# Patient Record
Sex: FEMALE | Race: WHITE | NOT HISPANIC OR LATINO
[De-identification: names, ages, dates, MRNs, and addresses within clinical notes are randomized per-mention and may not be internally consistent; named-entity substitution may affect disease eponyms.]

---

## 2017-02-28 ENCOUNTER — APPOINTMENT (OUTPATIENT)
Dept: PEDIATRIC MEDICAL GENETICS | Facility: CLINIC | Age: 37
End: 2017-02-28

## 2017-03-15 ENCOUNTER — APPOINTMENT (OUTPATIENT)
Dept: PEDIATRIC MEDICAL GENETICS | Facility: CLINIC | Age: 37
End: 2017-03-15

## 2017-03-16 ENCOUNTER — APPOINTMENT (OUTPATIENT)
Dept: HUMAN REPRODUCTION | Facility: CLINIC | Age: 37
End: 2017-03-16

## 2017-03-28 ENCOUNTER — APPOINTMENT (OUTPATIENT)
Dept: HUMAN REPRODUCTION | Facility: CLINIC | Age: 37
End: 2017-03-28

## 2017-03-28 ENCOUNTER — APPOINTMENT (OUTPATIENT)
Dept: RADIOLOGY | Facility: HOSPITAL | Age: 37
End: 2017-03-28

## 2017-04-06 ENCOUNTER — APPOINTMENT (OUTPATIENT)
Dept: HUMAN REPRODUCTION | Facility: CLINIC | Age: 37
End: 2017-04-06

## 2017-04-06 DIAGNOSIS — N97.9 FEMALE INFERTILITY, UNSPECIFIED: ICD-10-CM

## 2017-04-23 ENCOUNTER — TRANSCRIPTION ENCOUNTER (OUTPATIENT)
Age: 37
End: 2017-04-23

## 2017-04-25 ENCOUNTER — APPOINTMENT (OUTPATIENT)
Dept: HUMAN REPRODUCTION | Facility: CLINIC | Age: 37
End: 2017-04-25

## 2017-04-27 ENCOUNTER — APPOINTMENT (OUTPATIENT)
Dept: HUMAN REPRODUCTION | Facility: CLINIC | Age: 37
End: 2017-04-27

## 2017-05-30 ENCOUNTER — APPOINTMENT (OUTPATIENT)
Dept: HUMAN REPRODUCTION | Facility: CLINIC | Age: 37
End: 2017-05-30

## 2017-05-30 ENCOUNTER — OUTPATIENT (OUTPATIENT)
Dept: OUTPATIENT SERVICES | Facility: HOSPITAL | Age: 37
LOS: 1 days | End: 2017-05-30
Payer: COMMERCIAL

## 2017-05-30 ENCOUNTER — APPOINTMENT (OUTPATIENT)
Dept: RADIOLOGY | Facility: HOSPITAL | Age: 37
End: 2017-05-30

## 2017-05-30 DIAGNOSIS — N97.1 FEMALE INFERTILITY OF TUBAL ORIGIN: ICD-10-CM

## 2017-05-30 PROCEDURE — 74740 X-RAY FEMALE GENITAL TRACT: CPT

## 2017-05-30 PROCEDURE — 58340 CATHETER FOR HYSTEROGRAPHY: CPT

## 2017-06-08 ENCOUNTER — APPOINTMENT (OUTPATIENT)
Dept: HUMAN REPRODUCTION | Facility: CLINIC | Age: 37
End: 2017-06-08

## 2017-07-10 ENCOUNTER — OTHER (OUTPATIENT)
Age: 37
End: 2017-07-10

## 2017-07-10 DIAGNOSIS — Z31.69 ENCOUNTER FOR OTHER GENERAL COUNSELING AND ADVICE ON PROCREATION: ICD-10-CM

## 2017-07-10 RX ORDER — DOXYCYCLINE HYCLATE 100 MG/1
100 CAPSULE ORAL
Qty: 5 | Refills: 0 | Status: DISCONTINUED | COMMUNITY
Start: 2017-04-06 | End: 2017-07-10

## 2017-07-13 ENCOUNTER — APPOINTMENT (OUTPATIENT)
Dept: MATERNAL FETAL MEDICINE | Facility: CLINIC | Age: 37
End: 2017-07-13

## 2017-07-13 ENCOUNTER — APPOINTMENT (OUTPATIENT)
Dept: ANTEPARTUM | Facility: CLINIC | Age: 37
End: 2017-07-13

## 2017-07-13 VITALS
SYSTOLIC BLOOD PRESSURE: 138 MMHG | DIASTOLIC BLOOD PRESSURE: 82 MMHG | WEIGHT: 175 LBS | BODY MASS INDEX: 25.92 KG/M2 | HEIGHT: 69 IN

## 2017-07-31 ENCOUNTER — APPOINTMENT (OUTPATIENT)
Dept: HUMAN REPRODUCTION | Facility: CLINIC | Age: 37
End: 2017-07-31
Payer: COMMERCIAL

## 2017-07-31 PROCEDURE — 36415 COLL VENOUS BLD VENIPUNCTURE: CPT

## 2017-07-31 PROCEDURE — 99213 OFFICE O/P EST LOW 20 MIN: CPT | Mod: 25

## 2017-07-31 PROCEDURE — 76817 TRANSVAGINAL US OBSTETRIC: CPT

## 2017-08-07 ENCOUNTER — APPOINTMENT (OUTPATIENT)
Dept: HUMAN REPRODUCTION | Facility: CLINIC | Age: 37
End: 2017-08-07
Payer: COMMERCIAL

## 2017-08-07 PROCEDURE — 36415 COLL VENOUS BLD VENIPUNCTURE: CPT

## 2017-08-07 PROCEDURE — 76817 TRANSVAGINAL US OBSTETRIC: CPT

## 2017-08-07 PROCEDURE — 99213 OFFICE O/P EST LOW 20 MIN: CPT | Mod: 25

## 2017-08-14 ENCOUNTER — APPOINTMENT (OUTPATIENT)
Dept: HUMAN REPRODUCTION | Facility: CLINIC | Age: 37
End: 2017-08-14
Payer: COMMERCIAL

## 2017-08-14 PROCEDURE — 76817 TRANSVAGINAL US OBSTETRIC: CPT

## 2017-08-14 PROCEDURE — 99213 OFFICE O/P EST LOW 20 MIN: CPT | Mod: 25

## 2017-08-14 PROCEDURE — 36415 COLL VENOUS BLD VENIPUNCTURE: CPT

## 2017-09-18 ENCOUNTER — APPOINTMENT (OUTPATIENT)
Dept: PEDIATRIC MEDICAL GENETICS | Facility: CLINIC | Age: 37
End: 2017-09-18
Payer: COMMERCIAL

## 2017-09-18 DIAGNOSIS — O26.20 PREGNANCY CARE FOR PATIENT WITH RECURRENT PREGNANCY LOSS, UNSPECIFIED TRIMESTER: ICD-10-CM

## 2017-09-18 DIAGNOSIS — O09.529 SUPERVISION OF ELDERLY MULTIGRAVIDA, UNSPECIFIED TRIMESTER: ICD-10-CM

## 2017-09-18 PROCEDURE — 96040: CPT

## 2017-09-18 PROCEDURE — 36415 COLL VENOUS BLD VENIPUNCTURE: CPT

## 2017-09-19 ENCOUNTER — ASOB RESULT (OUTPATIENT)
Age: 37
End: 2017-09-19

## 2017-09-19 ENCOUNTER — APPOINTMENT (OUTPATIENT)
Dept: ANTEPARTUM | Facility: CLINIC | Age: 37
End: 2017-09-19
Payer: COMMERCIAL

## 2017-09-19 PROBLEM — O26.20 PREGNANCY COMPLICATED BY PREVIOUS RECURRENT MISCARRIAGES: Status: ACTIVE | Noted: 2017-09-19

## 2017-09-19 PROBLEM — O09.529 ADVANCED MATERNAL AGE IN MULTIGRAVIDA: Status: ACTIVE | Noted: 2017-09-19

## 2017-09-19 PROCEDURE — 76801 OB US < 14 WKS SINGLE FETUS: CPT

## 2017-09-19 PROCEDURE — 36416 COLLJ CAPILLARY BLOOD SPEC: CPT

## 2017-09-19 PROCEDURE — 76813 OB US NUCHAL MEAS 1 GEST: CPT

## 2017-11-20 ENCOUNTER — APPOINTMENT (OUTPATIENT)
Dept: ANTEPARTUM | Facility: CLINIC | Age: 37
End: 2017-11-20
Payer: COMMERCIAL

## 2017-11-20 ENCOUNTER — ASOB RESULT (OUTPATIENT)
Age: 37
End: 2017-11-20

## 2017-11-20 PROCEDURE — 76811 OB US DETAILED SNGL FETUS: CPT

## 2018-01-09 ENCOUNTER — APPOINTMENT (OUTPATIENT)
Dept: ANTEPARTUM | Facility: CLINIC | Age: 38
End: 2018-01-09
Payer: COMMERCIAL

## 2018-01-09 ENCOUNTER — ASOB RESULT (OUTPATIENT)
Age: 38
End: 2018-01-09

## 2018-01-09 PROCEDURE — 76816 OB US FOLLOW-UP PER FETUS: CPT

## 2018-03-14 ENCOUNTER — ASOB RESULT (OUTPATIENT)
Age: 38
End: 2018-03-14

## 2018-03-14 ENCOUNTER — APPOINTMENT (OUTPATIENT)
Dept: ANTEPARTUM | Facility: CLINIC | Age: 38
End: 2018-03-14
Payer: COMMERCIAL

## 2018-03-14 PROCEDURE — 76816 OB US FOLLOW-UP PER FETUS: CPT

## 2018-03-16 ENCOUNTER — APPOINTMENT (OUTPATIENT)
Dept: ANTEPARTUM | Facility: CLINIC | Age: 38
End: 2018-03-16

## 2018-04-06 ENCOUNTER — ASOB RESULT (OUTPATIENT)
Age: 38
End: 2018-04-06

## 2018-04-06 ENCOUNTER — APPOINTMENT (OUTPATIENT)
Dept: ANTEPARTUM | Facility: CLINIC | Age: 38
End: 2018-04-06
Payer: COMMERCIAL

## 2018-04-06 PROCEDURE — 76819 FETAL BIOPHYS PROFIL W/O NST: CPT

## 2018-04-08 ENCOUNTER — INPATIENT (INPATIENT)
Facility: HOSPITAL | Age: 38
LOS: 2 days | Discharge: HOME | End: 2018-04-11
Attending: OBSTETRICS & GYNECOLOGY | Admitting: OBSTETRICS & GYNECOLOGY
Payer: COMMERCIAL

## 2018-04-08 VITALS — HEIGHT: 69 IN | WEIGHT: 213.85 LBS

## 2018-04-08 DIAGNOSIS — O48.0 POST-TERM PREGNANCY: ICD-10-CM

## 2018-04-08 LAB
BASOPHILS # BLD AUTO: 0 K/UL — SIGNIFICANT CHANGE UP (ref 0–0.2)
BASOPHILS NFR BLD AUTO: 0.3 % — SIGNIFICANT CHANGE UP (ref 0–2)
BLD GP AB SCN SERPL QL: NEGATIVE — SIGNIFICANT CHANGE UP
EOSINOPHIL # BLD AUTO: 0.1 K/UL — SIGNIFICANT CHANGE UP (ref 0–0.5)
EOSINOPHIL NFR BLD AUTO: 0.7 % — SIGNIFICANT CHANGE UP (ref 0–6)
HCT VFR BLD CALC: 37.6 % — SIGNIFICANT CHANGE UP (ref 34.5–45)
HGB BLD-MCNC: 13.3 G/DL — SIGNIFICANT CHANGE UP (ref 11.5–15.5)
LYMPHOCYTES # BLD AUTO: 2.3 K/UL — SIGNIFICANT CHANGE UP (ref 1–3.3)
LYMPHOCYTES # BLD AUTO: 23.7 % — SIGNIFICANT CHANGE UP (ref 13–44)
MCHC RBC-ENTMCNC: 32.2 PG — SIGNIFICANT CHANGE UP (ref 27–34)
MCHC RBC-ENTMCNC: 35.4 GM/DL — SIGNIFICANT CHANGE UP (ref 32–36)
MCV RBC AUTO: 90.9 FL — SIGNIFICANT CHANGE UP (ref 80–100)
MONOCYTES # BLD AUTO: 1.2 K/UL — HIGH (ref 0–0.9)
MONOCYTES NFR BLD AUTO: 12.1 % — SIGNIFICANT CHANGE UP (ref 2–14)
NEUTROPHILS # BLD AUTO: 6.1 K/UL — SIGNIFICANT CHANGE UP (ref 1.8–7.4)
NEUTROPHILS NFR BLD AUTO: 63.2 % — SIGNIFICANT CHANGE UP (ref 43–77)
PLATELET # BLD AUTO: 258 K/UL — SIGNIFICANT CHANGE UP (ref 150–400)
RBC # BLD: 4.14 M/UL — SIGNIFICANT CHANGE UP (ref 3.8–5.2)
RBC # FLD: 12.9 % — SIGNIFICANT CHANGE UP (ref 10.3–14.5)
RH IG SCN BLD-IMP: POSITIVE — SIGNIFICANT CHANGE UP
WBC # BLD: 9.7 K/UL — SIGNIFICANT CHANGE UP (ref 3.8–10.5)
WBC # FLD AUTO: 9.7 K/UL — SIGNIFICANT CHANGE UP (ref 3.8–10.5)

## 2018-04-08 RX ORDER — SODIUM CHLORIDE 9 MG/ML
1000 INJECTION, SOLUTION INTRAVENOUS
Qty: 0 | Refills: 0 | Status: DISCONTINUED | OUTPATIENT
Start: 2018-04-08 | End: 2018-04-11

## 2018-04-08 RX ORDER — SODIUM CHLORIDE 9 MG/ML
1000 INJECTION, SOLUTION INTRAVENOUS
Qty: 0 | Refills: 0 | Status: DISCONTINUED | OUTPATIENT
Start: 2018-04-08 | End: 2018-04-08

## 2018-04-08 RX ORDER — OXYTOCIN 10 UNIT/ML
333.33 VIAL (ML) INJECTION
Qty: 20 | Refills: 0 | Status: DISCONTINUED | OUTPATIENT
Start: 2018-04-08 | End: 2018-04-09

## 2018-04-08 RX ORDER — SODIUM CHLORIDE 9 MG/ML
1000 INJECTION, SOLUTION INTRAVENOUS ONCE
Qty: 0 | Refills: 0 | Status: COMPLETED | OUTPATIENT
Start: 2018-04-08 | End: 2018-04-08

## 2018-04-08 RX ORDER — CITRIC ACID/SODIUM CITRATE 300-500 MG
15 SOLUTION, ORAL ORAL EVERY 4 HOURS
Qty: 0 | Refills: 0 | Status: DISCONTINUED | OUTPATIENT
Start: 2018-04-08 | End: 2018-04-09

## 2018-04-08 RX ADMIN — SODIUM CHLORIDE 2000 MILLILITER(S): 9 INJECTION, SOLUTION INTRAVENOUS at 20:45

## 2018-04-09 LAB — T PALLIDUM AB TITR SER: NEGATIVE — SIGNIFICANT CHANGE UP

## 2018-04-09 RX ORDER — SODIUM CHLORIDE 9 MG/ML
3 INJECTION INTRAMUSCULAR; INTRAVENOUS; SUBCUTANEOUS EVERY 8 HOURS
Qty: 0 | Refills: 0 | Status: DISCONTINUED | OUTPATIENT
Start: 2018-04-09 | End: 2018-04-09

## 2018-04-09 RX ORDER — HYDROCORTISONE 1 %
1 OINTMENT (GRAM) TOPICAL EVERY 4 HOURS
Qty: 0 | Refills: 0 | Status: DISCONTINUED | OUTPATIENT
Start: 2018-04-09 | End: 2018-04-09

## 2018-04-09 RX ORDER — DIBUCAINE 1 %
1 OINTMENT (GRAM) RECTAL EVERY 4 HOURS
Qty: 0 | Refills: 0 | Status: DISCONTINUED | OUTPATIENT
Start: 2018-04-09 | End: 2018-04-09

## 2018-04-09 RX ORDER — DIBUCAINE 1 %
1 OINTMENT (GRAM) RECTAL EVERY 4 HOURS
Qty: 0 | Refills: 0 | Status: DISCONTINUED | OUTPATIENT
Start: 2018-04-09 | End: 2018-04-11

## 2018-04-09 RX ORDER — AER TRAVELER 0.5 G/1
1 SOLUTION RECTAL; TOPICAL EVERY 4 HOURS
Qty: 0 | Refills: 0 | Status: DISCONTINUED | OUTPATIENT
Start: 2018-04-09 | End: 2018-04-09

## 2018-04-09 RX ORDER — SODIUM CHLORIDE 9 MG/ML
3 INJECTION INTRAMUSCULAR; INTRAVENOUS; SUBCUTANEOUS EVERY 8 HOURS
Qty: 0 | Refills: 0 | Status: DISCONTINUED | OUTPATIENT
Start: 2018-04-09 | End: 2018-04-11

## 2018-04-09 RX ORDER — LANOLIN
1 OINTMENT (GRAM) TOPICAL EVERY 6 HOURS
Qty: 0 | Refills: 0 | Status: DISCONTINUED | OUTPATIENT
Start: 2018-04-09 | End: 2018-04-11

## 2018-04-09 RX ORDER — IBUPROFEN 200 MG
600 TABLET ORAL EVERY 6 HOURS
Qty: 0 | Refills: 0 | Status: COMPLETED | OUTPATIENT
Start: 2018-04-09 | End: 2019-03-08

## 2018-04-09 RX ORDER — SIMETHICONE 80 MG/1
80 TABLET, CHEWABLE ORAL EVERY 6 HOURS
Qty: 0 | Refills: 0 | Status: DISCONTINUED | OUTPATIENT
Start: 2018-04-09 | End: 2018-04-11

## 2018-04-09 RX ORDER — ACETAMINOPHEN 500 MG
975 TABLET ORAL EVERY 6 HOURS
Qty: 0 | Refills: 0 | Status: COMPLETED | OUTPATIENT
Start: 2018-04-09 | End: 2019-03-08

## 2018-04-09 RX ORDER — IBUPROFEN 200 MG
600 TABLET ORAL EVERY 6 HOURS
Qty: 0 | Refills: 0 | Status: DISCONTINUED | OUTPATIENT
Start: 2018-04-09 | End: 2018-04-11

## 2018-04-09 RX ORDER — PRAMOXINE HYDROCHLORIDE 150 MG/15G
1 AEROSOL, FOAM RECTAL EVERY 4 HOURS
Qty: 0 | Refills: 0 | Status: DISCONTINUED | OUTPATIENT
Start: 2018-04-09 | End: 2018-04-11

## 2018-04-09 RX ORDER — OXYTOCIN 10 UNIT/ML
41.67 VIAL (ML) INJECTION
Qty: 20 | Refills: 0 | Status: DISCONTINUED | OUTPATIENT
Start: 2018-04-09 | End: 2018-04-09

## 2018-04-09 RX ORDER — ACETAMINOPHEN 500 MG
975 TABLET ORAL EVERY 6 HOURS
Qty: 0 | Refills: 0 | Status: DISCONTINUED | OUTPATIENT
Start: 2018-04-09 | End: 2018-04-11

## 2018-04-09 RX ORDER — GLYCERIN ADULT
1 SUPPOSITORY, RECTAL RECTAL AT BEDTIME
Qty: 0 | Refills: 0 | Status: DISCONTINUED | OUTPATIENT
Start: 2018-04-09 | End: 2018-04-11

## 2018-04-09 RX ORDER — PRAMOXINE HYDROCHLORIDE 150 MG/15G
1 AEROSOL, FOAM RECTAL EVERY 4 HOURS
Qty: 0 | Refills: 0 | Status: DISCONTINUED | OUTPATIENT
Start: 2018-04-09 | End: 2018-04-09

## 2018-04-09 RX ORDER — OXYTOCIN 10 UNIT/ML
41.67 VIAL (ML) INJECTION
Qty: 20 | Refills: 0 | Status: DISCONTINUED | OUTPATIENT
Start: 2018-04-09 | End: 2018-04-11

## 2018-04-09 RX ORDER — DOCUSATE SODIUM 100 MG
100 CAPSULE ORAL
Qty: 0 | Refills: 0 | Status: DISCONTINUED | OUTPATIENT
Start: 2018-04-09 | End: 2018-04-11

## 2018-04-09 RX ORDER — OXYCODONE HYDROCHLORIDE 5 MG/1
5 TABLET ORAL
Qty: 0 | Refills: 0 | Status: DISCONTINUED | OUTPATIENT
Start: 2018-04-09 | End: 2018-04-11

## 2018-04-09 RX ORDER — OXYCODONE HYDROCHLORIDE 5 MG/1
5 TABLET ORAL EVERY 4 HOURS
Qty: 0 | Refills: 0 | Status: DISCONTINUED | OUTPATIENT
Start: 2018-04-09 | End: 2018-04-11

## 2018-04-09 RX ORDER — AER TRAVELER 0.5 G/1
1 SOLUTION RECTAL; TOPICAL EVERY 4 HOURS
Qty: 0 | Refills: 0 | Status: DISCONTINUED | OUTPATIENT
Start: 2018-04-09 | End: 2018-04-11

## 2018-04-09 RX ORDER — HYDROCORTISONE 1 %
1 OINTMENT (GRAM) TOPICAL EVERY 4 HOURS
Qty: 0 | Refills: 0 | Status: DISCONTINUED | OUTPATIENT
Start: 2018-04-09 | End: 2018-04-11

## 2018-04-09 RX ORDER — TETANUS TOXOID, REDUCED DIPHTHERIA TOXOID AND ACELLULAR PERTUSSIS VACCINE, ADSORBED 5; 2.5; 8; 8; 2.5 [IU]/.5ML; [IU]/.5ML; UG/.5ML; UG/.5ML; UG/.5ML
0.5 SUSPENSION INTRAMUSCULAR ONCE
Qty: 0 | Refills: 0 | Status: DISCONTINUED | OUTPATIENT
Start: 2018-04-09 | End: 2018-04-11

## 2018-04-09 RX ORDER — MAGNESIUM HYDROXIDE 400 MG/1
30 TABLET, CHEWABLE ORAL
Qty: 0 | Refills: 0 | Status: DISCONTINUED | OUTPATIENT
Start: 2018-04-09 | End: 2018-04-11

## 2018-04-09 RX ORDER — DIPHENHYDRAMINE HCL 50 MG
25 CAPSULE ORAL EVERY 6 HOURS
Qty: 0 | Refills: 0 | Status: DISCONTINUED | OUTPATIENT
Start: 2018-04-09 | End: 2018-04-11

## 2018-04-09 RX ORDER — KETOROLAC TROMETHAMINE 30 MG/ML
30 SYRINGE (ML) INJECTION ONCE
Qty: 0 | Refills: 0 | Status: DISCONTINUED | OUTPATIENT
Start: 2018-04-09 | End: 2018-04-09

## 2018-04-09 RX ADMIN — Medication 975 MILLIGRAM(S): at 22:15

## 2018-04-09 RX ADMIN — Medication 600 MILLIGRAM(S): at 15:33

## 2018-04-09 RX ADMIN — Medication 600 MILLIGRAM(S): at 16:15

## 2018-04-09 RX ADMIN — Medication 975 MILLIGRAM(S): at 16:15

## 2018-04-09 RX ADMIN — Medication 975 MILLIGRAM(S): at 21:43

## 2018-04-09 RX ADMIN — Medication 30 MILLIGRAM(S): at 10:47

## 2018-04-09 RX ADMIN — Medication 975 MILLIGRAM(S): at 15:34

## 2018-04-09 RX ADMIN — Medication 600 MILLIGRAM(S): at 21:43

## 2018-04-09 RX ADMIN — Medication 30 MILLIGRAM(S): at 11:39

## 2018-04-09 RX ADMIN — SODIUM CHLORIDE 3 MILLILITER(S): 9 INJECTION INTRAMUSCULAR; INTRAVENOUS; SUBCUTANEOUS at 14:24

## 2018-04-09 RX ADMIN — Medication 600 MILLIGRAM(S): at 22:15

## 2018-04-10 LAB
HCT VFR BLD CALC: 33.6 % — LOW (ref 34.5–45)
HGB BLD-MCNC: 11.1 G/DL — LOW (ref 11.5–15.5)

## 2018-04-10 RX ADMIN — Medication 600 MILLIGRAM(S): at 08:15

## 2018-04-10 RX ADMIN — Medication 600 MILLIGRAM(S): at 14:15

## 2018-04-10 RX ADMIN — Medication 975 MILLIGRAM(S): at 07:13

## 2018-04-10 RX ADMIN — Medication 975 MILLIGRAM(S): at 18:19

## 2018-04-10 RX ADMIN — Medication 600 MILLIGRAM(S): at 07:12

## 2018-04-10 RX ADMIN — Medication 975 MILLIGRAM(S): at 08:15

## 2018-04-10 RX ADMIN — Medication 600 MILLIGRAM(S): at 20:49

## 2018-04-10 RX ADMIN — Medication 1 TABLET(S): at 08:35

## 2018-04-10 RX ADMIN — Medication 600 MILLIGRAM(S): at 21:10

## 2018-04-10 RX ADMIN — Medication 600 MILLIGRAM(S): at 15:15

## 2018-04-10 NOTE — PROGRESS NOTE ADULT - PROBLEM SELECTOR PLAN 1
- Pain well controlled, continue current pain regimen  - Increase ambulation, SCDs when not ambulating  - Continue regular diet    Sugey Fisher PGY-1

## 2018-04-10 NOTE — PROGRESS NOTE ADULT - SUBJECTIVE AND OBJECTIVE BOX
OB Progress Note:  PPD#1    S: 36yo PPD#1 s/p . Patient feels well. Pain is well controlled. She is tolerating a regular diet and passing flatus. She is voiding spontaneously, and ambulating without difficulty. Denies CP/SOB. Denies lightheadedness/dizziness. Denies N/V.    O:  Vitals:  Vital Signs Last 24 Hrs  T(C): 36.9 (2018 17:00), Max: 37.1 (2018 10:30)  T(F): 98.5 (2018 17:00), Max: 98.8 (2018 10:30)  HR: 96 (2018 17:00) (96 - 115)  BP: 128/83 (2018 17:00) (99/73 - 144/67)  BP(mean): --  RR: 18 (2018 17:00) (16 - 20)  SpO2: 96% (2018 12:30) (95% - 98%)    MEDICATIONS  (STANDING):  acetaminophen   Tablet. 975 milliGRAM(s) Oral every 6 hours  dextrose 5% + lactated ringers. 1000 milliLiter(s) (250 mL/Hr) IV Continuous <Continuous>  diphtheria/tetanus/pertussis (acellular) Vaccine (ADAcel) 0.5 milliLiter(s) IntraMuscular once  ibuprofen  Tablet 600 milliGRAM(s) Oral every 6 hours  oxyCODONE    IR 5 milliGRAM(s) Oral every 3 hours  oxytocin Infusion 41.667 milliUNIT(s)/Min (125 mL/Hr) IV Continuous <Continuous>  prenatal multivitamin 1 Tablet(s) Oral daily  sodium chloride 0.9% lock flush 3 milliLiter(s) IV Push every 8 hours      Labs:  Blood type: O Positive  Rubella IgG: RPR: Negative                          13.3   9.7 >-----------< 258    (  @ 21:02 )             37.6                  Physical Exam:  General: NAD  Abdomen: soft, non-tender, non-distended, fundus firm  Vaginal: Lochia wnl  Extremities: No erythema/edema

## 2018-04-11 ENCOUNTER — TRANSCRIPTION ENCOUNTER (OUTPATIENT)
Age: 38
End: 2018-04-11

## 2018-04-11 VITALS
HEART RATE: 96 BPM | SYSTOLIC BLOOD PRESSURE: 111 MMHG | TEMPERATURE: 98 F | RESPIRATION RATE: 18 BRPM | DIASTOLIC BLOOD PRESSURE: 62 MMHG

## 2018-04-11 PROCEDURE — 59050 FETAL MONITOR W/REPORT: CPT

## 2018-04-11 PROCEDURE — 86850 RBC ANTIBODY SCREEN: CPT

## 2018-04-11 PROCEDURE — 86780 TREPONEMA PALLIDUM: CPT

## 2018-04-11 PROCEDURE — 86900 BLOOD TYPING SEROLOGIC ABO: CPT

## 2018-04-11 PROCEDURE — 86901 BLOOD TYPING SEROLOGIC RH(D): CPT

## 2018-04-11 PROCEDURE — 85018 HEMOGLOBIN: CPT

## 2018-04-11 PROCEDURE — 59025 FETAL NON-STRESS TEST: CPT

## 2018-04-11 PROCEDURE — 85027 COMPLETE CBC AUTOMATED: CPT

## 2018-04-11 RX ADMIN — Medication 975 MILLIGRAM(S): at 00:50

## 2018-04-11 RX ADMIN — Medication 600 MILLIGRAM(S): at 06:00

## 2018-04-11 RX ADMIN — Medication 975 MILLIGRAM(S): at 06:30

## 2018-04-11 RX ADMIN — Medication 600 MILLIGRAM(S): at 06:30

## 2018-04-11 RX ADMIN — Medication 975 MILLIGRAM(S): at 06:00

## 2018-04-11 RX ADMIN — Medication 975 MILLIGRAM(S): at 00:20

## 2018-04-11 NOTE — DISCHARGE NOTE OB - CARE PLAN
Principal Discharge DX:	 (normal spontaneous vaginal delivery)  Goal:	Resume normal acitivities  Assessment and plan of treatment:	resume normal activities as tolerated

## 2018-04-11 NOTE — DISCHARGE NOTE OB - CARE PROVIDER_API CALL
Brian Kaiser (MD), Obstetrics and Gynecology  [address] [place] [address]  Phone: [phone]  Fax: [phone]

## 2018-04-11 NOTE — DISCHARGE NOTE OB - PATIENT PORTAL LINK FT
You can access the [facility] Patient Portal, offered by Health system, by registering with the following website: [email or web address]

## 2018-04-11 NOTE — DISCHARGE NOTE OB - MATERIALS PROVIDED
Back To Sleep Handout/Shaken Baby Prevention Handout/New Beginnings provided/[facility]  Screening Program/Birth Certificate Instructions

## 2018-11-12 ENCOUNTER — TRANSCRIPTION ENCOUNTER (OUTPATIENT)
Age: 38
End: 2018-11-12

## 2018-11-12 ENCOUNTER — INPATIENT (INPATIENT)
Facility: HOSPITAL | Age: 38
LOS: 2 days | Discharge: HOME | DRG: 194 | End: 2018-11-15
Attending: INTERNAL MEDICINE | Admitting: INTERNAL MEDICINE
Payer: COMMERCIAL

## 2018-11-12 VITALS
SYSTOLIC BLOOD PRESSURE: 148 MMHG | WEIGHT: 179.9 LBS | RESPIRATION RATE: 16 BRPM | HEIGHT: 69 IN | TEMPERATURE: 100 F | DIASTOLIC BLOOD PRESSURE: 80 MMHG | HEART RATE: 112 BPM | OXYGEN SATURATION: 98 %

## 2018-11-12 DIAGNOSIS — J18.1 LOBAR PNEUMONIA, UNSPECIFIED ORGANISM: ICD-10-CM

## 2018-11-12 LAB
ALBUMIN SERPL ELPH-MCNC: 4.2 G/DL — SIGNIFICANT CHANGE UP (ref 3.3–5)
ALP SERPL-CCNC: 130 U/L — HIGH (ref 40–120)
ALT FLD-CCNC: 56 U/L — HIGH (ref 10–45)
ANION GAP SERPL CALC-SCNC: 17 MMOL/L — SIGNIFICANT CHANGE UP (ref 5–17)
APPEARANCE UR: CLEAR — SIGNIFICANT CHANGE UP
APTT BLD: 29.5 SEC — SIGNIFICANT CHANGE UP (ref 27.5–36.3)
AST SERPL-CCNC: 34 U/L — SIGNIFICANT CHANGE UP (ref 10–40)
BASOPHILS # BLD AUTO: 0 K/UL — SIGNIFICANT CHANGE UP (ref 0–0.2)
BASOPHILS NFR BLD AUTO: 0.2 % — SIGNIFICANT CHANGE UP (ref 0–2)
BILIRUB SERPL-MCNC: 0.3 MG/DL — SIGNIFICANT CHANGE UP (ref 0.2–1.2)
BILIRUB UR-MCNC: NEGATIVE — SIGNIFICANT CHANGE UP
BUN SERPL-MCNC: 6 MG/DL — LOW (ref 7–23)
CALCIUM SERPL-MCNC: 9.7 MG/DL — SIGNIFICANT CHANGE UP (ref 8.4–10.5)
CHLORIDE SERPL-SCNC: 98 MMOL/L — SIGNIFICANT CHANGE UP (ref 96–108)
CO2 SERPL-SCNC: 23 MMOL/L — SIGNIFICANT CHANGE UP (ref 22–31)
COLOR SPEC: SIGNIFICANT CHANGE UP
CREAT SERPL-MCNC: 0.71 MG/DL — SIGNIFICANT CHANGE UP (ref 0.5–1.3)
DIFF PNL FLD: ABNORMAL
EOSINOPHIL # BLD AUTO: 0 K/UL — SIGNIFICANT CHANGE UP (ref 0–0.5)
EOSINOPHIL NFR BLD AUTO: 0.3 % — SIGNIFICANT CHANGE UP (ref 0–6)
GAS PNL BLDV: SIGNIFICANT CHANGE UP
GLUCOSE SERPL-MCNC: 108 MG/DL — HIGH (ref 70–99)
GLUCOSE UR QL: NEGATIVE — SIGNIFICANT CHANGE UP
HCT VFR BLD CALC: 40.2 % — SIGNIFICANT CHANGE UP (ref 34.5–45)
HGB BLD-MCNC: 14 G/DL — SIGNIFICANT CHANGE UP (ref 11.5–15.5)
INR BLD: 1.29 RATIO — HIGH (ref 0.88–1.16)
KETONES UR-MCNC: SIGNIFICANT CHANGE UP
LACTATE SERPL-SCNC: 0.8 MMOL/L — SIGNIFICANT CHANGE UP (ref 0.7–2)
LEUKOCYTE ESTERASE UR-ACNC: NEGATIVE — SIGNIFICANT CHANGE UP
LYMPHOCYTES # BLD AUTO: 1.9 K/UL — SIGNIFICANT CHANGE UP (ref 1–3.3)
LYMPHOCYTES # BLD AUTO: 16.5 % — SIGNIFICANT CHANGE UP (ref 13–44)
MCHC RBC-ENTMCNC: 30 PG — SIGNIFICANT CHANGE UP (ref 27–34)
MCHC RBC-ENTMCNC: 34.9 GM/DL — SIGNIFICANT CHANGE UP (ref 32–36)
MCV RBC AUTO: 86.1 FL — SIGNIFICANT CHANGE UP (ref 80–100)
MONOCYTES # BLD AUTO: 1.4 K/UL — HIGH (ref 0–0.9)
MONOCYTES NFR BLD AUTO: 12 % — SIGNIFICANT CHANGE UP (ref 2–14)
NEUTROPHILS # BLD AUTO: 8.3 K/UL — HIGH (ref 1.8–7.4)
NEUTROPHILS NFR BLD AUTO: 71 % — SIGNIFICANT CHANGE UP (ref 43–77)
NITRITE UR-MCNC: NEGATIVE — SIGNIFICANT CHANGE UP
PH UR: 6.5 — SIGNIFICANT CHANGE UP (ref 5–8)
PLATELET # BLD AUTO: 338 K/UL — SIGNIFICANT CHANGE UP (ref 150–400)
POTASSIUM SERPL-MCNC: 3.6 MMOL/L — SIGNIFICANT CHANGE UP (ref 3.5–5.3)
POTASSIUM SERPL-SCNC: 3.6 MMOL/L — SIGNIFICANT CHANGE UP (ref 3.5–5.3)
PROT SERPL-MCNC: 8.6 G/DL — HIGH (ref 6–8.3)
PROT UR-MCNC: ABNORMAL
PROTHROM AB SERPL-ACNC: 14.9 SEC — HIGH (ref 10–12.9)
RAPID RVP RESULT: DETECTED
RBC # BLD: 4.67 M/UL — SIGNIFICANT CHANGE UP (ref 3.8–5.2)
RBC # FLD: 12.2 % — SIGNIFICANT CHANGE UP (ref 10.3–14.5)
RV+EV RNA SPEC QL NAA+PROBE: DETECTED
SODIUM SERPL-SCNC: 138 MMOL/L — SIGNIFICANT CHANGE UP (ref 135–145)
SP GR SPEC: 1.01 — LOW (ref 1.01–1.02)
UROBILINOGEN FLD QL: NEGATIVE — SIGNIFICANT CHANGE UP
WBC # BLD: 11.7 K/UL — HIGH (ref 3.8–10.5)
WBC # FLD AUTO: 11.7 K/UL — HIGH (ref 3.8–10.5)

## 2018-11-12 PROCEDURE — 99285 EMERGENCY DEPT VISIT HI MDM: CPT | Mod: 25

## 2018-11-12 PROCEDURE — 93010 ELECTROCARDIOGRAM REPORT: CPT

## 2018-11-12 PROCEDURE — 71046 X-RAY EXAM CHEST 2 VIEWS: CPT | Mod: 26

## 2018-11-12 RX ORDER — ACETAMINOPHEN 500 MG
650 TABLET ORAL ONCE
Qty: 0 | Refills: 0 | Status: COMPLETED | OUTPATIENT
Start: 2018-11-12 | End: 2018-11-12

## 2018-11-12 RX ORDER — KETOROLAC TROMETHAMINE 30 MG/ML
15 SYRINGE (ML) INJECTION ONCE
Qty: 0 | Refills: 0 | Status: DISCONTINUED | OUTPATIENT
Start: 2018-11-12 | End: 2018-11-12

## 2018-11-12 RX ORDER — METOCLOPRAMIDE HCL 10 MG
10 TABLET ORAL ONCE
Qty: 0 | Refills: 0 | Status: COMPLETED | OUTPATIENT
Start: 2018-11-12 | End: 2018-11-12

## 2018-11-12 RX ORDER — SODIUM CHLORIDE 9 MG/ML
2500 INJECTION INTRAMUSCULAR; INTRAVENOUS; SUBCUTANEOUS ONCE
Qty: 0 | Refills: 0 | Status: COMPLETED | OUTPATIENT
Start: 2018-11-12 | End: 2018-11-12

## 2018-11-12 RX ORDER — ONDANSETRON 8 MG/1
4 TABLET, FILM COATED ORAL ONCE
Qty: 0 | Refills: 0 | Status: DISCONTINUED | OUTPATIENT
Start: 2018-11-12 | End: 2018-11-12

## 2018-11-12 RX ORDER — SODIUM CHLORIDE 9 MG/ML
1000 INJECTION INTRAMUSCULAR; INTRAVENOUS; SUBCUTANEOUS
Qty: 0 | Refills: 0 | Status: DISCONTINUED | OUTPATIENT
Start: 2018-11-12 | End: 2018-11-14

## 2018-11-12 RX ORDER — IPRATROPIUM/ALBUTEROL SULFATE 18-103MCG
3 AEROSOL WITH ADAPTER (GRAM) INHALATION ONCE
Qty: 0 | Refills: 0 | Status: COMPLETED | OUTPATIENT
Start: 2018-11-12 | End: 2018-11-12

## 2018-11-12 RX ORDER — SODIUM CHLORIDE 9 MG/ML
1000 INJECTION INTRAMUSCULAR; INTRAVENOUS; SUBCUTANEOUS ONCE
Qty: 0 | Refills: 0 | Status: COMPLETED | OUTPATIENT
Start: 2018-11-12 | End: 2018-11-12

## 2018-11-12 RX ADMIN — SODIUM CHLORIDE 100 MILLILITER(S): 9 INJECTION INTRAMUSCULAR; INTRAVENOUS; SUBCUTANEOUS at 17:13

## 2018-11-12 RX ADMIN — SODIUM CHLORIDE 2500 MILLILITER(S): 9 INJECTION INTRAMUSCULAR; INTRAVENOUS; SUBCUTANEOUS at 10:09

## 2018-11-12 RX ADMIN — Medication 650 MILLIGRAM(S): at 19:31

## 2018-11-12 RX ADMIN — Medication 650 MILLIGRAM(S): at 10:10

## 2018-11-12 RX ADMIN — Medication 15 MILLIGRAM(S): at 13:45

## 2018-11-12 RX ADMIN — SODIUM CHLORIDE 1000 MILLILITER(S): 9 INJECTION INTRAMUSCULAR; INTRAVENOUS; SUBCUTANEOUS at 13:45

## 2018-11-12 RX ADMIN — Medication 10 MILLIGRAM(S): at 11:58

## 2018-11-12 RX ADMIN — Medication 3 MILLILITER(S): at 11:56

## 2018-11-12 RX ADMIN — Medication 1200 MILLIGRAM(S): at 22:00

## 2018-11-12 RX ADMIN — Medication 650 MILLIGRAM(S): at 10:40

## 2018-11-12 NOTE — H&P ADULT - ASSESSMENT
38 year old female no PMH pw cough, fever, chills, diarrhea, weakness worsening over past 6 days. reports feeling ill after taking care of her children (3yo & 7mn old) after they came back with a cold from . Patient reports unproductive cough, along with shortness of breath when coughing. Denies chest pain, long car rides. Also with diarrhea for past few days along with associated abdominal pain, nausea and vomiting. Denies blood in diarrhea and vomitus. Decreased PO intake and trouble keeping down fluids and food over last few days. Patient also with headache and mild neck pain. Gradual onset, without photophobia, no focal weakness. Denies travel recently except for [place] 6 weeks ago. Reports she is in the process of moving to [place]. 38 year old female no PMH pw cough, fever, chills, diarrhea, weakness worsening over past 6 days. reports feeling ill after taking care of her children (3yo & 7mn old) after they came back with a cold from . Patient reports unproductive cough, along with shortness of breath when coughing. Denies chest pain, long car rides. Also with diarrhea for past few days along with associated abdominal pain, nausea and vomiting. Denies blood in diarrhea and vomitus. Decreased PO intake and trouble keeping down fluids and food over last few days. Patient also with headache and mild neck pain. Gradual onset, without photophobia, no focal weakness. Denies travel recently except for [place] 6 weeks ago. Reports she is in the process of moving to [place].    ID: Likely CAP with RML infiltrate. IVF normal saline 100/h. Mucinex 1,200 mg bid. RSV panel positive.   Procalcitonin mild elevation at .13. Agree with IV Levaquin 500 mg IVSS daily. UA negative for   signs of infection. Oxygen sat 92 % on RA in the ER. Add nasal canuli 2.0 lpm. Will check   lactate today.

## 2018-11-12 NOTE — ED ADULT NURSE NOTE - NSIMPLEMENTINTERV_GEN_ALL_ED
Implemented All Universal Safety Interventions:  [place] to call system. Call bell, personal items and telephone within reach. Instruct patient to call for assistance. Room bathroom lighting operational. Non-slip footwear when patient is off stretcher. Physically safe environment: no spills, clutter or unnecessary equipment. Stretcher in lowest position, wheels locked, appropriate side rails in place.

## 2018-11-12 NOTE — ED ADULT NURSE NOTE - OBJECTIVE STATEMENT
37 yo presents to the ED from PMD office by EMS with  at bedside. A&Ox4 c/o flu like symptoms x 6 days. pt reports body aches, chills, fever and fatigue pt reports productive cough with green sputum. pt reports sick contact at home with child. pt reports decreased PO intake and nausea. denies vomiting. last time she took Tylenol was yesterday with minimal relief. pt reports SOB and chest tightness. O2 sat on RA is 92% upon assessment, pt placed on 2L NC. pt received flu shot this year. reports decreased UO. 18G RAC, 20G L forearm.

## 2018-11-12 NOTE — ED PROVIDER NOTE - PHYSICAL EXAMINATION
Physical Exam:  Gen: NAD, AOx3, ill appearing   HEENT: EOMI,  normal conjunctiva, oral mucosa dry, throat mild erythema  Lung: CTAB, no respiratory distress, no wheezes/rhonchi/rales B/L, cough, non productive, speaking in full sentences  CV: RRR, no murmurs, rubs or gallops  Abd: soft, mild tenderness, ND, no guarding, no CVA tenderness   MSK: no visible deformities, ROM normal in UE/LE, paraspinal cervical spine tenderness, no pain with flexion or extension of the neck  Neuro: No focal sensory or motor deficits  Skin: Warm, well perfused, no rash  Psych: normal affect, calm  ~Alexey Wyman D.O. -Resident

## 2018-11-12 NOTE — ED PROVIDER NOTE - OBJECTIVE STATEMENT
38f no pmh, pw cough, fever, chills, diarrhea, weakness worsening over past 6 days. reports feeling ill after taking care of her children (3yo & 7mn old) after they came back with a cold from . patient reports unproductive cough, along with shortness of breath when coughing. Denies chest pain, long car rides. Also with diarrhea for past few days along with associated abdominal pain, nausea and vomiting. Denies blood in diarrhea and vomitus. Decreased PO intake and trouble keeping down fluids and food over last few days. Patient also with Headache and mild neck pain. Gradual onset, without photophobia, no focal weakness. Denies travel recently except for [place] 6 weeks ago. Reports she is in the process of moving to [place]. 38f no pmh, pw cough, fever, chills, diarrhea, weakness worsening over past 6 days. reports feeling ill after taking care of her children (3yo & 7mn old) after they came back with a cold from . patient reports unproductive cough, along with shortness of breath when coughing. Denies chest pain, long car rides. Also with diarrhea for past few days along with associated abdominal pain, nausea and vomiting. Denies blood in diarrhea and vomitus. Decreased PO intake and trouble keeping down fluids and food over last few days. Patient also with Headache and mild neck pain. Gradual onset, without photophobia, no focal weakness. Denies travel recently except for [place] 6 weeks ago. Reports she is in the process of moving to [place].    Dr. Agapito Miramontes pcp

## 2018-11-12 NOTE — ED PROVIDER NOTE - ATTENDING CONTRIBUTION TO CARE
Dr. Wilde (Attending Physician)  I performed a history and physical exam of the patient and discussed their management with the resident. I reviewed the resident's note and agree with the documented findings and plan of care. My medical decision making and observations are found above.

## 2018-11-12 NOTE — ED PROVIDER NOTE - NS ED ROS FT
ROS:  GENERAL: + fever, + chills  EYES: no change in vision  HEENT: no trouble swallowing, no trouble speaking  CARDIAC: no chest pain  PULMONARY: + cough, + shortness of breath  GI: + abdominal pain,+ nausea, + vomiting, + diarrhea, no constipation  : No dysuria, no frequency, no change in appearance, or odor of urine  SKIN: no rashes  NEURO: + headache,+ weakness  MSK: generalized body aches  ~Alexey Wyman D.O. -Resident

## 2018-11-12 NOTE — ED PROVIDER NOTE - CARE PLAN
Principal Discharge DX:	Pneumonia of right middle lobe due to infectious organism Principal Discharge DX:	Viral pneumonia  Secondary Diagnosis:	Pneumonia of right middle lobe due to infectious organism

## 2018-11-12 NOTE — H&P ADULT - NSHPLABSRESULTS_GEN_ALL_CORE
LABS:                        14.0   11.7  )-----------( 338      ( 2018 10:15 )             40.2         138  |  98  |  6<L>  ----------------------------<  108<H>  3.6   |  23  |  0.71    Ca    9.7      2018 10:15    TPro  8.6<H>  /  Alb  4.2  /  TBili  0.3  /  DBili  x   /  AST  34  /  ALT  56<H>  /  AlkPhos  130<H>      PT/INR - ( 2018 10:15 )   PT: 14.9 sec;   INR: 1.29 ratio         PTT - ( 2018 10:15 )  PTT:29.5 sec  Urinalysis Basic - ( 2018 11:59 )    Color: Light Yellow / Appearance: Clear / S.009 / pH: x  Gluc: x / Ketone: Trace  / Bili: Negative / Urobili: Negative   Blood: x / Protein: 30 mg/dL / Nitrite: Negative   Leuk Esterase: Negative / RBC: 4 /hpf / WBC 5 /hpf   Sq Epi: x / Non Sq Epi: 6 /hpf / Bacteria: Moderate          RADIOLOGY & ADDITIONAL TESTS:

## 2018-11-12 NOTE — ED PROVIDER NOTE - MEDICAL DECISION MAKING DETAILS
Dr. Wilde (Attending Physician)  Pt. with no sig pmh pw fever, chills, cough, sore throat, muscle aches, n/v/d.  will check labs, rvp, cxr, give IV fluids, reassess. Dr. Wilde (Attending Physician)  Pt. with no sig pmh pw fever, chills, cough, sore throat, muscle aches, n/v/d.  will check labs, rvp, cxr, give IV fluids, reassess.    38f pw cough, fevers, chills, body aches, abdominal pain diarrhea, likely viral URI sx with sick children, will RVP, labs, fluids, tylenol, zofran, cxr, ekg, patient hypoxic on RA to 91%, will continue 2L NC and likely admit

## 2018-11-12 NOTE — H&P ADULT - NSHPPHYSICALEXAM_GEN_ALL_CORE
PHYSICAL EXAMINATION:  Vital Signs Last 24 Hrs  T(C): 37.7 (12 Nov 2018 12:18), Max: 39.4 (12 Nov 2018 09:44)  T(F): 99.8 (12 Nov 2018 12:18), Max: 102.9 (12 Nov 2018 09:44)  HR: 115 (12 Nov 2018 12:18) (101 - 115)  BP: 123/78 (12 Nov 2018 12:18) (123/78 - 148/80)  BP(mean): --  RR: 18 (12 Nov 2018 12:18) (16 - 18)  SpO2: 97% (12 Nov 2018 12:18) (96% - 98%)  CAPILLARY BLOOD GLUCOSE          GENERAL: NAD, well-groomed, well-developed  HEAD:  atraumatic, normocephalic  EYES: sclera anicteric  ENMT: mucous membranes moist  NECK: supple, No JVD  CHEST/LUNG: clear to auscultation bilaterally; no rales, rhonchi, or wheezing b/l  HEART: normal S1, S2  ABDOMEN: BS+, soft, ND, NT   EXTREMITIES:  pulses palpable; no clubbing, cyanosis, or edema b/l LEs  NEURO: awake, alert, interactive; moves all extremities  SKIN: no rashes or lesions PHYSICAL EXAMINATION:  Vital Signs Last 24 Hrs  T(C): 37.7 (12 Nov 2018 12:18), Max: 39.4 (12 Nov 2018 09:44)  T(F): 99.8 (12 Nov 2018 12:18), Max: 102.9 (12 Nov 2018 09:44)  HR: 115 (12 Nov 2018 12:18) (101 - 115)  BP: 123/78 (12 Nov 2018 12:18) (123/78 - 148/80)  BP(mean): --  RR: 18 (12 Nov 2018 12:18) (16 - 18)  SpO2: 97% (12 Nov 2018 12:18) (96% - 98%)  CAPILLARY BLOOD GLUCOSE          GENERAL:  at rest with hypoxemia   HEAD:  atraumatic, normocephalic  EYES: sclera anicteric  ENMT: mucous membranes moist  NECK: supple, No JVD  CHEST/LUNG: clear to auscultation bilaterally; no rales, rhonchi, or wheezing b/l  HEART: normal S1, S2  ABDOMEN: BS+, soft, ND, NT   EXTREMITIES:  pulses palpable; no clubbing, cyanosis, or edema b/l LEs  NEURO: awake, alert, interactive; moves all extremities  SKIN: no rashes or lesions

## 2018-11-12 NOTE — ED PROVIDER NOTE - CHIEF COMPLAINT
The patient is a 38y Female complaining of The patient is a 38y Female complaining of cough, fever, chills

## 2018-11-13 ENCOUNTER — TRANSCRIPTION ENCOUNTER (OUTPATIENT)
Age: 38
End: 2018-11-13

## 2018-11-13 LAB
ANION GAP SERPL CALC-SCNC: 11 MMOL/L — SIGNIFICANT CHANGE UP (ref 5–17)
BUN SERPL-MCNC: 4 MG/DL — LOW (ref 7–23)
CALCIUM SERPL-MCNC: 8.6 MG/DL — SIGNIFICANT CHANGE UP (ref 8.4–10.5)
CHLORIDE SERPL-SCNC: 107 MMOL/L — SIGNIFICANT CHANGE UP (ref 96–108)
CO2 SERPL-SCNC: 23 MMOL/L — SIGNIFICANT CHANGE UP (ref 22–31)
CREAT SERPL-MCNC: 0.71 MG/DL — SIGNIFICANT CHANGE UP (ref 0.5–1.3)
CULTURE RESULTS: SIGNIFICANT CHANGE UP
GLUCOSE SERPL-MCNC: 102 MG/DL — HIGH (ref 70–99)
HCT VFR BLD CALC: 31.5 % — LOW (ref 34.5–45)
HGB BLD-MCNC: 10.6 G/DL — LOW (ref 11.5–15.5)
MCHC RBC-ENTMCNC: 28.9 PG — SIGNIFICANT CHANGE UP (ref 27–34)
MCHC RBC-ENTMCNC: 33.7 GM/DL — SIGNIFICANT CHANGE UP (ref 32–36)
MCV RBC AUTO: 85.8 FL — SIGNIFICANT CHANGE UP (ref 80–100)
PLATELET # BLD AUTO: 294 K/UL — SIGNIFICANT CHANGE UP (ref 150–400)
POTASSIUM SERPL-MCNC: 3.3 MMOL/L — LOW (ref 3.5–5.3)
POTASSIUM SERPL-SCNC: 3.3 MMOL/L — LOW (ref 3.5–5.3)
RBC # BLD: 3.67 M/UL — LOW (ref 3.8–5.2)
RBC # FLD: 13.9 % — SIGNIFICANT CHANGE UP (ref 10.3–14.5)
SODIUM SERPL-SCNC: 141 MMOL/L — SIGNIFICANT CHANGE UP (ref 135–145)
SPECIMEN SOURCE: SIGNIFICANT CHANGE UP
WBC # BLD: 9.96 K/UL — SIGNIFICANT CHANGE UP (ref 3.8–10.5)
WBC # FLD AUTO: 9.96 K/UL — SIGNIFICANT CHANGE UP (ref 3.8–10.5)

## 2018-11-13 PROCEDURE — 71250 CT THORAX DX C-: CPT | Mod: 26

## 2018-11-13 RX ORDER — IPRATROPIUM/ALBUTEROL SULFATE 18-103MCG
3 AEROSOL WITH ADAPTER (GRAM) INHALATION ONCE
Qty: 0 | Refills: 0 | Status: COMPLETED | OUTPATIENT
Start: 2018-11-13 | End: 2018-11-13

## 2018-11-13 RX ORDER — IBUPROFEN 200 MG
600 TABLET ORAL ONCE
Qty: 0 | Refills: 0 | Status: COMPLETED | OUTPATIENT
Start: 2018-11-13 | End: 2018-11-13

## 2018-11-13 RX ORDER — POTASSIUM CHLORIDE 20 MEQ
20 PACKET (EA) ORAL
Qty: 0 | Refills: 0 | Status: COMPLETED | OUTPATIENT
Start: 2018-11-13 | End: 2018-11-13

## 2018-11-13 RX ORDER — IBUPROFEN 200 MG
400 TABLET ORAL ONCE
Qty: 0 | Refills: 0 | Status: COMPLETED | OUTPATIENT
Start: 2018-11-13 | End: 2018-11-13

## 2018-11-13 RX ORDER — ACETAMINOPHEN 500 MG
650 TABLET ORAL ONCE
Qty: 0 | Refills: 0 | Status: DISCONTINUED | OUTPATIENT
Start: 2018-11-13 | End: 2018-11-13

## 2018-11-13 RX ADMIN — Medication 400 MILLIGRAM(S): at 22:37

## 2018-11-13 RX ADMIN — Medication 600 MILLIGRAM(S): at 09:35

## 2018-11-13 RX ADMIN — Medication 1200 MILLIGRAM(S): at 06:43

## 2018-11-13 RX ADMIN — Medication 20 MILLIEQUIVALENT(S): at 09:35

## 2018-11-13 RX ADMIN — SODIUM CHLORIDE 100 MILLILITER(S): 9 INJECTION INTRAMUSCULAR; INTRAVENOUS; SUBCUTANEOUS at 01:51

## 2018-11-13 RX ADMIN — Medication 3 MILLILITER(S): at 10:47

## 2018-11-13 RX ADMIN — SODIUM CHLORIDE 75 MILLILITER(S): 9 INJECTION INTRAMUSCULAR; INTRAVENOUS; SUBCUTANEOUS at 22:38

## 2018-11-13 RX ADMIN — SODIUM CHLORIDE 75 MILLILITER(S): 9 INJECTION INTRAMUSCULAR; INTRAVENOUS; SUBCUTANEOUS at 09:37

## 2018-11-13 RX ADMIN — Medication 400 MILLIGRAM(S): at 23:00

## 2018-11-13 RX ADMIN — Medication 20 MILLIEQUIVALENT(S): at 12:37

## 2018-11-13 RX ADMIN — Medication 600 MILLIGRAM(S): at 09:45

## 2018-11-13 RX ADMIN — Medication 1200 MILLIGRAM(S): at 17:49

## 2018-11-13 NOTE — CONSULT NOTE ADULT - ASSESSMENT
RLL community acquired pneumonia with rhinovrus infection - likelyh secondary bacterial  r/o atypicals    REC    Check legionella Ag  Continue Levaquin  Nasal Oxygen to sat .89%  f/u imaging to monitor for parapneumonic effusion, though exam consistent with consolidation

## 2018-11-13 NOTE — PROGRESS NOTE ADULT - ASSESSMENT
38 year old female no PMH pw cough, fever, chills, diarrhea, weakness worsening over past 6 days. reports feeling ill after taking care of her children (5yo & 7mn old) after they came back with a cold from . Patient reports unproductive cough, along with shortness of breath when coughing. Denies chest pain, long car rides. Also with diarrhea for past few days along with associated abdominal pain, nausea and vomiting. Denies blood in diarrhea and vomitus. Decreased PO intake and trouble keeping down fluids and food over last few days. Patient also with headache and mild neck pain. Gradual onset, without photophobia, no focal weakness. Denies travel recently except for [place] ago. Reports she is in the process of moving to [place].    ID: Likely CAP with RML infiltrate. IVF normal saline decreased to 75/h. Mucinex 1,200 mg bid. RSV panel positive.   Procalcitonin mild elevation at .13. Agree with IV Levaquin 500 mg IVSS daily. UA negative for   signs of infection. Oxygen sat 92 % on RA in the ER. Add nasal canuli 2.0 lpm. Will check   lactate today.  Will ask Pulmonary to follow, CT chest non-contrast as well ordered.   WBC improved to 10,000. 38 year old female no PMH pw cough, fever, chills, diarrhea, weakness worsening over past 6 days. reports feeling ill after taking care of her children (3yo & 7mn old) after they came back with a cold from . Patient reports unproductive cough, along with shortness of breath when coughing. Denies chest pain, long car rides. Also with diarrhea for past few days along with associated abdominal pain, nausea and vomiting. Denies blood in diarrhea and vomitus. Decreased PO intake and trouble keeping down fluids and food over last few days. Patient also with headache and mild neck pain. Gradual onset, without photophobia, no focal weakness. Denies travel recently except for [place] 6 weeks ago. Reports she is in the process of moving to [place].    ID: Likely CAP with RML infiltrate. IVF normal saline decreased to 75/h. Mucinex 1,200 mg bid. RSV panel positive.   Procalcitonin mild elevation at .13. Agree with IV Levaquin 500 mg IVSS daily. UA negative for   signs of infection. Oxygen sat 92 % on RA in the ER. Add nasal canuli 2.0 lpm. Will check   lactate today.  Will ask Pulmonary to follow, CT chest non-contrast as well ordered.   WBC improved to 10,000. Lactate normal at .80.

## 2018-11-13 NOTE — DISCHARGE NOTE ADULT - HOSPITAL COURSE
38 year old female  with no  PMH , who presented with cough, fever, chills, diarrhea, weakness worsening over past 6 days. Likely CAP with RML infiltrate. IVF stopped. Mucinex 1,200 mg bid. RSV panel positive. Procalcitonin mild elevation at .13. Pt treated with IV Levaquin 500 mg IVSS daily. UA negative for signs of infection. Oxygen sat 92 % on RA in the ER, improved on supplemental oxygen.l CT chest non-contrast shows RLL and LLL infiltrate.  WBC improved to 10,000. Lactate normal at 0.80    Pt improved today , oxygenating well on RA : 95% with ambulation, continues to with coughing especially at night , keeping from sleeping well.  Pt is cleared for discharge today. She will continue on oral Levaquin for total of 10 days, and Hycodan as needed for cough, Proventil inhaler as needed. Pt will follow-up with PMD in 1 week, and repeat CXR in one month . Pt advised to continue pump and dump , especially while on hycodan. 38 year old female  no significant PMH presented with cough, fever, chills, diarrhea, weakness worsening over past 6 days. Likely CAP with RML infiltrate. Started on IVF, Mucinex 1,200 mg bid. RSV panel positive. Procalcitonin mild elevation at .13.     Pt treated with IV Levaquin 500 mg IVSS daily. UA negative for signs of infection. Oxygen sat 92 % on RA in the ER, improved on supplemental oxygen. CT chest non-contrast confirmed shows RLL and LLL infiltrate.  WBC improved to 10,000. Lactate normal at 0.80. Urine legionella antigen negative. Urine and blood cultures negative.     Pt improved by discharge. Oxygenating well RA 95% with ambulation. Will continue on oral Levaquin for total of 10 days, and Hycodan as needed for cough, Proventil inhaler as needed. Pt will follow-up with PMD in 1 week, and repeat CXR in one month   to document resolution of RLL infiltrate seen on CXR. See attached med list. 38 year old female  no significant PMH presented with cough, fever, chills, diarrhea, weakness worsening over past 6 days. Likely CAP with RML infiltrate. Started on IVF, Mucinex 1,200 mg bid. RSV panel positive. Procalcitonin mild elevation at .13.     Pt treated with IV Levaquin 500 mg IVSS daily. UA negative for signs of infection. Oxygen sat 92 % on RA in the ER, improved on supplemental oxygen. CT chest non-contrast confirmed shows RLL and LLL infiltrate.  WBC improved to 10,000. Lactate normal at 0.80. Urine legionella antigen negative. Urine and blood cultures negative.     Pt improved by discharge. Oxygenating well RA 95% with ambulation. Will continue on oral Levaquin for total of 10 days, and Hycodan as needed for cough, Proventil inhaler as needed. Pt will follow-up with PMD in 1 week, and repeat CXR in one month   to document resolution of RLL infiltrate seen on CXR. See attached med list.  Stable for discharge.

## 2018-11-13 NOTE — CONSULT NOTE ADULT - SUBJECTIVE AND OBJECTIVE BOX
PULMONARY CONSULT  Marcus Crowley MD  [phone]    Initial HPI on admission:  HPI:  38 year old female no PMH pw cough, fever, chills, diarrhea, weakness worsening over past 6 days. reports feeling ill after taking care of her children (5yo & 7mn old) after they came back with a cold from . Patient reports unproductive cough, along with shortness of breath when coughing. Denies chest pain, long car rides. Also with diarrhea for past few days along with associated abdominal pain, nausea and vomiting. Denies blood in diarrhea and vomitus. Decreased PO intake and trouble keeping down fluids and food over last few days. Patient also with headache and mild neck pain. Gradual onset, without photophobia, no focal weakness. Denies travel recently except for [place] 6 weeks ago. Reports she is in the process of moving to [place]. (2018 13:28)    Patient denies history of COPD/Asthma or prior pneumonia. C/O daily fever, chills, sweats with cough productive of greenish sputum. + sick contact with children.   Patient took no antibiotics prior to admission. SHe c/o water diarrhea which has peristed since admission    PAST MEDICAL & SURGICAL HISTORY:  No pertinent past medical history  No significant past surgical history    Allergies    sulfa drugs (Vomiting)    Intolerances      FAMILY HISTORY:    Social history: Non smoker, 2 children age 3 and 7 months    Medications:  MEDICATIONS  (STANDING):  guaiFENesin ER 1200 milliGRAM(s) Oral every 12 hours  levoFLOXacin IVPB      levoFLOXacin IVPB 500 milliGRAM(s) IV Intermittent every 24 hours  sodium chloride 0.9%. 1000 milliLiter(s) (75 mL/Hr) IV Continuous <Continuous>    MEDICATIONS  (PRN):    Vital Signs Last 24 Hrs  T(C): 37.4 (2018 12:34), Max: 39.4 (2018 09:08)  T(F): 99.4 (2018 12:34), Max: 102.9 (2018 09:08)  HR: 99 (2018 12:34) (96 - 115)  BP: 119/74 (2018 12:34) (109/71 - 134/83)  BP(mean): --  RR: 17 (2018 12:34) (17 - 18)  SpO2: 93% (2018 12:34) (92% - 96%)           @ 07:01  -   @ 07:00  --------------------------------------------------------  IN: 1000 mL / OUT: 0 mL / NET: 1000 mL      LABS:                        10.6   9.96  )-----------( 294      ( 2018 07:51 )             31.5         141  |  107  |  4<L>  ----------------------------<  102<H>  3.3<L>   |  23  |  0.71    Ca    8.6      2018 06:51    TPro  8.6<H>  /  Alb  4.2  /  TBili  0.3  /  DBili  x   /  AST  34  /  ALT  56<H>  /  AlkPhos  130<H>        PT/INR - ( 2018 10:15 )   PT: 14.9 sec;   INR: 1.29 ratio         PTT - ( 2018 10:15 )  PTT:29.5 sec  Urinalysis Basic - ( 2018 11:59 )    Color: Light Yellow / Appearance: Clear / S.009 / pH: x  Gluc: x / Ketone: Trace  / Bili: Negative / Urobili: Negative   Blood: x / Protein: 30 mg/dL / Nitrite: Negative   Leuk Esterase: Negative / RBC: 4 /hpf / WBC 5 /hpf   Sq Epi: x / Non Sq Epi: 6 /hpf / Bacteria: Moderate      Procalcitonin, Serum: 0.13 ng/mL (18 @ 10:15)  < from: Xray Chest 2 Views PA/Lat (18 @ 12:46) >      Physical Examination:    General: Mildly toxic, no dyspnea; sat 92% on nasal O2    HEENT: Pupils equal, reactive to light.  Symmetric.    PULM: R basilar diiminished BS with egophony and bronchial breathing; L clear; no wheeze    CVS: Regular rate and rhythm, no murmurs, rubs, or gallops    ABD: Soft, nondistended, nontender, normoactive bowel sounds, no masses    EXT: No edema, nontender    SKIN: Warm and well perfused, no rashes noted.    NEURO: Alert, oriented, interactive, nonfocal    RADIOLOGY REVIEWED PERSONALLY  CXR:  EXAM:  XR CHEST PA LAT 2V                            PROCEDURE DATE:  2018        INTERPRETATION:  PA and lateral projection of the chest was obtained on   2018.    Indication: Sepsis. Shortness of breath.    Impression:    The heart is normal in size. Right lower lobe pneumonia. The left lung is   clear.      CT chest:    TTE: PULMONARY CONSULT  Marcus Crowley MD  [phone]    Initial HPI on admission:  HPI:  38 year old female no PMH pw cough, fever, chills, diarrhea, weakness worsening over past 6 days. reports feeling ill after taking care of her children (3yo & 7mn old) after they came back with a cold from . Patient reports unproductive cough, along with shortness of breath when coughing. Denies chest pain, long car rides. Also with diarrhea for past few days along with associated abdominal pain, nausea and vomiting. Denies blood in diarrhea and vomitus. Decreased PO intake and trouble keeping down fluids and food over last few days. Patient also with headache and mild neck pain. Gradual onset, without photophobia, no focal weakness. Denies travel recently except for [place] 6 weeks ago. Reports she is in the process of moving to [place]. (2018 13:28)    Patient denies history of COPD/Asthma or prior pneumonia. C/O daily fever, chills, sweats with cough productive of greenish sputum. + sick contact with children.   Patient took no antibiotics prior to admission. SHe c/o water diarrhea which has peristed since admission    PAST MEDICAL & SURGICAL HISTORY:  No pertinent past medical history  No significant past surgical history    Allergies    sulfa drugs (Vomiting)    Intolerances     Review of Systems:  Other Review of Systems: All other review of systems negative, except as noted in HPI	      Allergies and Intolerances:        Allergies:  	sulfa drugs: Drug Category, Vomiting    FAMILY HISTORY: Non contributory    Social history: Non smoker, 2 children age 4 and 7 months;  with spouse    Medications:  MEDICATIONS  (STANDING):  guaiFENesin ER 1200 milliGRAM(s) Oral every 12 hours  levoFLOXacin IVPB      levoFLOXacin IVPB 500 milliGRAM(s) IV Intermittent every 24 hours  sodium chloride 0.9%. 1000 milliLiter(s) (75 mL/Hr) IV Continuous <Continuous>    MEDICATIONS  (PRN):    Vital Signs Last 24 Hrs  T(C): 37.4 (2018 12:34), Max: 39.4 (2018 09:08)  T(F): 99.4 (2018 12:34), Max: 102.9 (2018 09:08)  HR: 99 (2018 12:34) (96 - 115)  BP: 119/74 (2018 12:34) (109/71 - 134/83)  BP(mean): --  RR: 17 (2018 12:34) (17 - 18)  SpO2: 93% (2018 12:34) (92% - 96%)           @ 07:01  -   @ 07:00  --------------------------------------------------------  IN: 1000 mL / OUT: 0 mL / NET: 1000 mL      LABS:                        10.6   9.96  )-----------( 294      ( 2018 07:51 )             31.5         141  |  107  |  4<L>  ----------------------------<  102<H>  3.3<L>   |  23  |  0.71    Ca    8.6      2018 06:51    TPro  8.6<H>  /  Alb  4.2  /  TBili  0.3  /  DBili  x   /  AST  34  /  ALT  56<H>  /  AlkPhos  130<H>        PT/INR - ( 2018 10:15 )   PT: 14.9 sec;   INR: 1.29 ratio         PTT - ( 2018 10:15 )  PTT:29.5 sec  Urinalysis Basic - ( 2018 11:59 )    Color: Light Yellow / Appearance: Clear / S.009 / pH: x  Gluc: x / Ketone: Trace  / Bili: Negative / Urobili: Negative   Blood: x / Protein: 30 mg/dL / Nitrite: Negative   Leuk Esterase: Negative / RBC: 4 /hpf / WBC 5 /hpf   Sq Epi: x / Non Sq Epi: 6 /hpf / Bacteria: Moderate      Procalcitonin, Serum: 0.13 ng/mL (18 @ 10:15)  < from: Xray Chest 2 Views PA/Lat (18 @ 12:46) >      Physical Examination:    General: Mildly toxic, no dyspnea; sat 92% on nasal O2    HEENT: Pupils equal, reactive to light.  Symmetric.    PULM: R basilar diiminished BS with egophony and bronchial breathing; L clear; no wheeze    CVS: Regular rate and rhythm, no murmurs, rubs, or gallops    ABD: Soft, nondistended, nontender, normoactive bowel sounds, no masses    EXT: No edema, nontender    SKIN: Warm and well perfused, no rashes noted.    NEURO: Alert, oriented, interactive, nonfocal    RADIOLOGY REVIEWED PERSONALLY  CXR:  EXAM:  XR CHEST PA LAT 2V                            PROCEDURE DATE:  2018        INTERPRETATION:  PA and lateral projection of the chest was obtained on   2018.    Indication: Sepsis. Shortness of breath.    Impression:    The heart is normal in size. Right lower lobe pneumonia. The left lung is   clear.      CT chest:    TTE:

## 2018-11-13 NOTE — DISCHARGE NOTE ADULT - MEDICATION SUMMARY - MEDICATIONS TO TAKE
I will START or STAY ON the medications listed below when I get home from the hospital:    albuterol 90 mcg/inh inhalation aerosol  -- 2 puff(s) inhaled every 6 hours, As needed, Shortness of Breath and/or Wheezing  -- Indication: For shortness of breath and or wheezing    guaiFENesin 1200 mg oral tablet, extended release  -- 1 tab(s) by mouth every 12 hours  -- Indication: For cough    levoFLOXacin 500 mg oral tablet  -- 1 tab(s) by mouth once a day MDD:thru Nov 21st.  -- Avoid prolonged or excessive exposure to direct and/or artificial sunlight while taking this medication.  Do not take dairy products, antacids, or iron preparations within one hour of this medication.  Finish all this medication unless otherwise directed by prescriber.  May cause drowsiness or dizziness.  Medication should be taken with plenty of water.    -- Indication: For Pneumonia of right middle lobe due to infectious organism    HYDROcodone-homatropine 5 mg-1.5 mg/5 mL oral syrup  -- 5 milliliter(s) by mouth every 6 hours, As needed, Cough MDD:20 ml  -- Indication: For cough

## 2018-11-13 NOTE — DISCHARGE NOTE ADULT - PATIENT PORTAL LINK FT
You can access the [facility] Patient Portal, offered by [facility], by registering with the following website: [email or web address]

## 2018-11-13 NOTE — DISCHARGE NOTE ADULT - CARE PLAN
Principal Discharge DX:	Pneumonia of right middle lobe due to infectious organism  Goal:	Resolution of infection  Assessment and plan of treatment:	Pneumonia is a lung infection that can cause a fever, cough, and trouble breathing.  Continue all antibiotics as ordered until complete.  Nutrition is important, eat small frequent meals.  Get lots of rest and drink fluids.  Call your health care provider upon arrival home from hospital and make a follow up appointment for one week.  If your cough worsens, you develop fever greater than 101', you have shaking chills, a fast heartbeat, trouble breathing and/or feel your are breathing much faster than usual, call your healthcare provider.  Make sure you wash your hands frequently.  Secondary Diagnosis:	Viral pneumonia  Assessment and plan of treatment:	Maintain adequate hydration, nutrition, rest, and acitivity as tolerated.  Follow-up with your primary care physician within 1 week. Call for appointment.  Secondary Diagnosis:	Lactating mother  Assessment and plan of treatment:	Continue with Pump and Dump  Follow-up with your primary care physician within 1 week. Call for appointment. Principal Discharge DX:	Pneumonia of right middle lobe due to infectious organism  Goal:	Resolution of infection  Assessment and plan of treatment:	Pneumonia is a lung infection that can cause a fever, cough, and trouble breathing.  Continue all antibiotics as ordered until complete.  Nutrition is important, eat small frequent meals.  Get lots of rest and drink fluids.  Call your health care provider upon arrival home from hospital and make a follow up appointment for one week.  If your cough worsens, you develop fever greater than 101', you have shaking chills, a fast heartbeat, trouble breathing and/or feel your are breathing much faster than usual, call your healthcare provider.  Make sure you wash your hands frequently.  Secondary Diagnosis:	Viral pneumonia  Assessment and plan of treatment:	Maintain adequate hydration, nutrition, rest, and acitivity as tolerated.  Follow-up with your primary care physician within 1 week. Call for appointment.  Secondary Diagnosis:	Lactating mother  Assessment and plan of treatment:	Continue with Pump and Dump til  further instructions per doctor.   Follow-up with your primary care physician within 1 week. Call for appointment.

## 2018-11-13 NOTE — PROGRESS NOTE ADULT - SUBJECTIVE AND OBJECTIVE BOX
INTERVAL HPI/OVERNIGHT EVENTS:  Pt seen and examined at bedside.     Allergies/Intolerance: sulfa drugs (Vomiting)      MEDICATIONS  (STANDING):  guaiFENesin ER 1200 milliGRAM(s) Oral every 12 hours  levoFLOXacin IVPB      levoFLOXacin IVPB 500 milliGRAM(s) IV Intermittent every 24 hours  potassium chloride    Tablet ER 20 milliEquivalent(s) Oral every 2 hours  sodium chloride 0.9%. 1000 milliLiter(s) (100 mL/Hr) IV Continuous <Continuous>    MEDICATIONS  (PRN):        ROS: all systems reviewed and wnl      PHYSICAL EXAMINATION:  Vital Signs Last 24 Hrs  T(C): 37.6 (2018 04:57), Max: 39.4 (2018 09:44)  T(F): 99.6 (2018 04:57), Max: 102.9 (2018 09:44)  HR: 96 (2018 04:57) (96 - 115)  BP: 116/73 (2018 04:57) (109/71 - 148/80)  BP(mean): --  RR: 17 (2018 04:57) (16 - 18)  SpO2: 95% (2018 04:57) (93% - 98%)  CAPILLARY BLOOD GLUCOSE           @ 07:01  -   @ 07:00  --------------------------------------------------------  IN: 1000 mL / OUT: 0 mL / NET: 1000 mL        GENERAL:   NECK: supple, No JVD  CHEST/LUNG: clear to auscultation bilaterally; no rales, rhonchi, or wheezing b/l  HEART: normal S1, S2  ABDOMEN: BS+, soft, ND, NT   EXTREMITIES:  pulses palpable; no clubbing, cyanosis, or edema b/l LEs  SKIN: no rashes or lesions      LABS:                        10.6   9.96  )-----------( 294      ( 2018 07:51 )             31.5     11-    141  |  107  |  4<L>  ----------------------------<  102<H>  3.3<L>   |  23  |  0.71    Ca    8.6      2018 06:51    TPro  8.6<H>  /  Alb  4.2  /  TBili  0.3  /  DBili  x   /  AST  34  /  ALT  56<H>  /  AlkPhos  130<H>  11-12    PT/INR - ( 2018 10:15 )   PT: 14.9 sec;   INR: 1.29 ratio         PTT - ( 2018 10:15 )  PTT:29.5 sec  Urinalysis Basic - ( 2018 11:59 )    Color: Light Yellow / Appearance: Clear / S.009 / pH: x  Gluc: x / Ketone: Trace  / Bili: Negative / Urobili: Negative   Blood: x / Protein: 30 mg/dL / Nitrite: Negative   Leuk Esterase: Negative / RBC: 4 /hpf / WBC 5 /hpf   Sq Epi: x / Non Sq Epi: 6 /hpf / Bacteria: Moderate INTERVAL HPI/OVERNIGHT EVENTS:  Pt seen and examined at bedside.     Allergies/Intolerance: sulfa drugs (Vomiting)      MEDICATIONS  (STANDING):  guaiFENesin ER 1200 milliGRAM(s) Oral every 12 hours  levoFLOXacin IVPB      levoFLOXacin IVPB 500 milliGRAM(s) IV Intermittent every 24 hours  potassium chloride    Tablet ER 20 milliEquivalent(s) Oral every 2 hours  sodium chloride 0.9%. 1000 milliLiter(s) (100 mL/Hr) IV Continuous <Continuous>    MEDICATIONS  (PRN):        ROS: all systems reviewed and wnl      PHYSICAL EXAMINATION:  Vital Signs Last 24 Hrs  T(C): 37.6 (2018 04:57), Max: 39.4 (2018 09:44)  T(F): 99.6 (2018 04:57), Max: 102.9 (2018 09:44)  HR: 96 (2018 04:57) (96 - 115)  BP: 116/73 (2018 04:57) (109/71 - 148/80)  BP(mean): --  RR: 17 (2018 04:57) (16 - 18)  SpO2: 95% (2018 04:57) (93% - 98%)  CAPILLARY BLOOD GLUCOSE           @ 07:01  -   @ 07:00  --------------------------------------------------------  IN: 1000 mL / OUT: 0 mL / NET: 1000 mL        GENERAL: tired, no SOB at rest, had fever today, Oxygen sat improved to RA 96 %  NECK: supple, No JVD  CHEST/LUNG: decreased BS right base, no wheeze.   HEART: normal S1, S2  ABDOMEN: BS+, soft, ND, NT   EXTREMITIES:  pulses palpable; no clubbing, cyanosis, or edema b/l LEs  SKIN: no rashes or lesions      LABS:                        10.6   9.96  )-----------( 294      ( 2018 07:51 )             31.5     -    141  |  107  |  4<L>  ----------------------------<  102<H>  3.3<L>   |  23  |  0.71    Ca    8.6      2018 06:51    TPro  8.6<H>  /  Alb  4.2  /  TBili  0.3  /  DBili  x   /  AST  34  /  ALT  56<H>  /  AlkPhos  130<H>  11-12    PT/INR - ( 2018 10:15 )   PT: 14.9 sec;   INR: 1.29 ratio         PTT - ( 2018 10:15 )  PTT:29.5 sec  Urinalysis Basic - ( 2018 11:59 )    Color: Light Yellow / Appearance: Clear / S.009 / pH: x  Gluc: x / Ketone: Trace  / Bili: Negative / Urobili: Negative   Blood: x / Protein: 30 mg/dL / Nitrite: Negative   Leuk Esterase: Negative / RBC: 4 /hpf / WBC 5 /hpf   Sq Epi: x / Non Sq Epi: 6 /hpf / Bacteria: Moderate

## 2018-11-13 NOTE — DISCHARGE NOTE ADULT - PLAN OF CARE
Resolution of infection Pneumonia is a lung infection that can cause a fever, cough, and trouble breathing.  Continue all antibiotics as ordered until complete.  Nutrition is important, eat small frequent meals.  Get lots of rest and drink fluids.  Call your health care provider upon arrival home from hospital and make a follow up appointment for one week.  If your cough worsens, you develop fever greater than 101', you have shaking chills, a fast heartbeat, trouble breathing and/or feel your are breathing much faster than usual, call your healthcare provider.  Make sure you wash your hands frequently. Maintain adequate hydration, nutrition, rest, and acitivity as tolerated.  Follow-up with your primary care physician within 1 week. Call for appointment. Continue with Pump and Dump  Follow-up with your primary care physician within 1 week. Call for appointment. Continue with Pump and Dump til  further instructions per doctor.   Follow-up with your primary care physician within 1 week. Call for appointment.

## 2018-11-13 NOTE — DISCHARGE NOTE ADULT - NS AS DC FOLLOWUP STROKE INST
Smoking Cessation/Influenza vaccination (VIS Pub Date: August 7, 2015)/Pneumonia Pneumonia/Influenza vaccination (VIS Pub Date: August 7, 2015)

## 2018-11-14 LAB
ANION GAP SERPL CALC-SCNC: 13 MMOL/L — SIGNIFICANT CHANGE UP (ref 5–17)
BUN SERPL-MCNC: 6 MG/DL — LOW (ref 7–23)
CALCIUM SERPL-MCNC: 9.8 MG/DL — SIGNIFICANT CHANGE UP (ref 8.4–10.5)
CHLORIDE SERPL-SCNC: 106 MMOL/L — SIGNIFICANT CHANGE UP (ref 96–108)
CO2 SERPL-SCNC: 22 MMOL/L — SIGNIFICANT CHANGE UP (ref 22–31)
CREAT SERPL-MCNC: 0.62 MG/DL — SIGNIFICANT CHANGE UP (ref 0.5–1.3)
GLUCOSE SERPL-MCNC: 108 MG/DL — HIGH (ref 70–99)
HCT VFR BLD CALC: 33.7 % — LOW (ref 34.5–45)
HGB BLD-MCNC: 11 G/DL — LOW (ref 11.5–15.5)
LEGIONELLA AG UR QL: NEGATIVE — SIGNIFICANT CHANGE UP
MCHC RBC-ENTMCNC: 28.1 PG — SIGNIFICANT CHANGE UP (ref 27–34)
MCHC RBC-ENTMCNC: 32.6 GM/DL — SIGNIFICANT CHANGE UP (ref 32–36)
MCV RBC AUTO: 86 FL — SIGNIFICANT CHANGE UP (ref 80–100)
PLATELET # BLD AUTO: 338 K/UL — SIGNIFICANT CHANGE UP (ref 150–400)
POTASSIUM SERPL-MCNC: 3.6 MMOL/L — SIGNIFICANT CHANGE UP (ref 3.5–5.3)
POTASSIUM SERPL-SCNC: 3.6 MMOL/L — SIGNIFICANT CHANGE UP (ref 3.5–5.3)
RBC # BLD: 3.92 M/UL — SIGNIFICANT CHANGE UP (ref 3.8–5.2)
RBC # FLD: 14.1 % — SIGNIFICANT CHANGE UP (ref 10.3–14.5)
SODIUM SERPL-SCNC: 141 MMOL/L — SIGNIFICANT CHANGE UP (ref 135–145)
WBC # BLD: 6.3 K/UL — SIGNIFICANT CHANGE UP (ref 3.8–10.5)
WBC # FLD AUTO: 6.3 K/UL — SIGNIFICANT CHANGE UP (ref 3.8–10.5)

## 2018-11-14 RX ORDER — ENOXAPARIN SODIUM 100 MG/ML
40 INJECTION SUBCUTANEOUS DAILY
Qty: 0 | Refills: 0 | Status: DISCONTINUED | OUTPATIENT
Start: 2018-11-14 | End: 2018-11-15

## 2018-11-14 RX ADMIN — Medication 1200 MILLIGRAM(S): at 06:00

## 2018-11-14 RX ADMIN — ENOXAPARIN SODIUM 40 MILLIGRAM(S): 100 INJECTION SUBCUTANEOUS at 18:39

## 2018-11-14 RX ADMIN — Medication 1200 MILLIGRAM(S): at 18:30

## 2018-11-14 NOTE — PROGRESS NOTE ADULT - ASSESSMENT
Community acquired multilobar pneumonia: clinically improving today    REC    Continue Levaquin  Mobilize as tolerated

## 2018-11-14 NOTE — PROGRESS NOTE ADULT - SUBJECTIVE AND OBJECTIVE BOX
INTERVAL HPI/OVERNIGHT EVENTS:  Pt seen and examined at bedside.     Allergies/Intolerance: sulfa drugs (Vomiting)      MEDICATIONS  (STANDING):  guaiFENesin ER 1200 milliGRAM(s) Oral every 12 hours  levoFLOXacin IVPB      levoFLOXacin IVPB 500 milliGRAM(s) IV Intermittent every 24 hours  sodium chloride 0.9%. 1000 milliLiter(s) (75 mL/Hr) IV Continuous <Continuous>    MEDICATIONS  (PRN):        ROS: all systems reviewed and wnl      PHYSICAL EXAMINATION:  Vital Signs Last 24 Hrs  T(C): 36.7 (2018 04:49), Max: 38.9 (2018 10:16)  T(F): 98 (2018 04:49), Max: 102.1 (2018 10:16)  HR: 75 (2018 04:49) (75 - 105)  BP: 107/69 (2018 04:49) (107/69 - 122/82)  BP(mean): --  RR: 17 (2018 04:49) (17 - 17)  SpO2: 98% (2018 04:49) (92% - 98%)  CAPILLARY BLOOD GLUCOSE           @ 07:01  -  14 @ 07:00  --------------------------------------------------------  IN: 1140 mL / OUT: 0 mL / NET: 1140 mL        GENERAL:   NECK: supple, No JVD  CHEST/LUNG: clear to auscultation bilaterally; no rales, rhonchi, or wheezing b/l  HEART: normal S1, S2  ABDOMEN: BS+, soft, ND, NT   EXTREMITIES:  pulses palpable; no clubbing, cyanosis, or edema b/l LEs  SKIN: no rashes or lesions      LABS:                        10.6   9.96  )-----------( 294      ( 2018 07:51 )             31.5         141  |  106  |  6<L>  ----------------------------<  108<H>  3.6   |  22  |  0.62    Ca    9.8      2018 07:08    TPro  8.6<H>  /  Alb  4.2  /  TBili  0.3  /  DBili  x   /  AST  34  /  ALT  56<H>  /  AlkPhos  130<H>  11-12    PT/INR - ( 2018 10:15 )   PT: 14.9 sec;   INR: 1.29 ratio         PTT - ( 2018 10:15 )  PTT:29.5 sec  Urinalysis Basic - ( 2018 11:59 )    Color: Light Yellow / Appearance: Clear / S.009 / pH: x  Gluc: x / Ketone: Trace  / Bili: Negative / Urobili: Negative   Blood: x / Protein: 30 mg/dL / Nitrite: Negative   Leuk Esterase: Negative / RBC: 4 /hpf / WBC 5 /hpf   Sq Epi: x / Non Sq Epi: 6 /hpf / Bacteria: Moderate INTERVAL HPI/OVERNIGHT EVENTS:  Pt seen and examined at bedside.     Allergies/Intolerance: sulfa drugs (Vomiting)      MEDICATIONS  (STANDING):  guaiFENesin ER 1200 milliGRAM(s) Oral every 12 hours  levoFLOXacin IVPB      levoFLOXacin IVPB 500 milliGRAM(s) IV Intermittent every 24 hours  sodium chloride 0.9%. 1000 milliLiter(s) (75 mL/Hr) IV Continuous <Continuous>    MEDICATIONS  (PRN):        ROS: all systems reviewed and wnl      PHYSICAL EXAMINATION:  Vital Signs Last 24 Hrs  T(C): 36.7 (2018 04:49), Max: 38.9 (2018 10:16)  T(F): 98 (2018 04:49), Max: 102.1 (2018 10:16)  HR: 75 (2018 04:49) (75 - 105)  BP: 107/69 (2018 04:49) (107/69 - 122/82)  BP(mean): --  RR: 17 (2018 04:49) (17 - 17)  SpO2: 98% (2018 04:49) (92% - 98%)  CAPILLARY BLOOD GLUCOSE           @ 07:01  -  14 @ 07:00  --------------------------------------------------------  IN: 1140 mL / OUT: 0 mL / NET: 1140 mL        GENERAL: less SOB, no wheeze or fevers this AM, no CP  NECK: supple, No JVD  CHEST/LUNG: decreased BS right base  HEART: normal S1, S2  ABDOMEN: BS+, soft, ND, NT   EXTREMITIES:  pulses palpable; no clubbing, cyanosis, or edema b/l LEs  SKIN: no rashes or lesions      LABS:                        10.6   9.96  )-----------( 294      ( 2018 07:51 )             31.5         141  |  106  |  6<L>  ----------------------------<  108<H>  3.6   |  22  |  0.62    Ca    9.8      2018 07:08    TPro  8.6<H>  /  Alb  4.2  /  TBili  0.3  /  DBili  x   /  AST  34  /  ALT  56<H>  /  AlkPhos  130<H>  11-12    PT/INR - ( 2018 10:15 )   PT: 14.9 sec;   INR: 1.29 ratio         PTT - ( 2018 10:15 )  PTT:29.5 sec  Urinalysis Basic - ( 2018 11:59 )    Color: Light Yellow / Appearance: Clear / S.009 / pH: x  Gluc: x / Ketone: Trace  / Bili: Negative / Urobili: Negative   Blood: x / Protein: 30 mg/dL / Nitrite: Negative   Leuk Esterase: Negative / RBC: 4 /hpf / WBC 5 /hpf   Sq Epi: x / Non Sq Epi: 6 /hpf / Bacteria: Moderate

## 2018-11-14 NOTE — PROGRESS NOTE ADULT - SUBJECTIVE AND OBJECTIVE BOX
Follow-up Pulm Progress Note  Marcus Crowley MD  [phone]    Afebrile so far today.  Feels better  Breathing comfortably supine  CT Chest Reviewed: RLL dense consolidation with trace effusion; smaller patchy LLL consolidation  Lg antigen negative    Medications:  Vital Signs Last 24 Hrs  T(C): 36.9 (14 Nov 2018 12:14), Max: 37.3 (13 Nov 2018 20:53)  T(F): 98.5 (14 Nov 2018 12:14), Max: 99.1 (13 Nov 2018 20:53)  HR: 88 (14 Nov 2018 12:14) (75 - 95)  BP: 108/73 (14 Nov 2018 12:14) (107/69 - 122/82)  BP(mean): --  RR: 18 (14 Nov 2018 12:14) (17 - 18)  SpO2: 96% (14 Nov 2018 12:14) (96% - 98%)      11-13 @ 07:01  -  11-14 @ 07:00  --------------------------------------------------------  IN: 1140 mL / OUT: 0 mL / NET: 1140 mL    LABS:                        11.0   6.30  )-----------( 338      ( 14 Nov 2018 09:01 )             33.7     11-14    141  |  106  |  6<L>  ----------------------------<  108<H>  3.6   |  22  |  0.62    Ca    9.8      14 Nov 2018 07:08          Procalcitonin, Serum: 0.13 ng/mL (11-12-18 @ 10:15)    CULTURES:  Culture Results:   Culture grew 3 or more types of organisms which indicate  collection contamination; consider recollection only if clinically  indicated. (11-12 @ 17:02)  Culture Results:   No growth to date. (11-12 @ 14:18)  Culture Results:   No growth to date. (11-12 @ 12:32)    Most recent blood culture -- 11-12 @ 17:02   -- -- .Urine Clean Catch (Midstream) 11-12 @ 17:02  Most recent blood culture -- 11-12 @ 14:18   -- -- .Blood Blood-Peripheral 11-12 @ 14:18  Most recent blood culture -- 11-12 @ 12:32   -- -- .Blood Blood-Peripheral 11-12 @ 12:32      Physical Examination:  PULM: Imlproved aeration RLL: decr in bronchial breathing; L clear  CVS: Regular rate and rhythm, no murmurs, rubs, or gallops  ABD: Soft, non-tender  EXT:  No clubbing, cyanosis, or edema    RADIOLOGY REVIEWED PERSONALLY  CXR:    CT chest:    PROCEDURE DATE:  11/13/2018        INTERPRETATION:  CLINICAL INFORMATION: Shortness of breath and hypoxemia.   Right lower lobe pneumonia on radiograph.    COMPARISON: Chest radiograph 11/12/2018. No prior chest CT.    PROCEDURE:   CT of the Chest was performed without intravenous contrast.  Sagittal and coronal reformats were performed.   Maximum intensity projection images were generated.    FINDINGS:    LUNGS AND LARGE AIRWAYS: Patent central airways. Dense consolidation of   the right lower lobe and left lower lobe compatible with multifocal   pneumonia.  PLEURA: Trace right pleural effusion  VESSELS: Within normal limits.  HEART: Heart size is normal. No pericardial effusion.  MEDIASTINUM AND KENNY: Enlarged right hilar mediastinal lymph nodes   incompletely evaluated without contrast.  CHEST WALL AND LOWER NECK: Within normal limits.  VISUALIZED UPPER ABDOMEN: Within normal limits.  BONES: Within normal limits.    IMPRESSION: Dense consolidation  of the right lower lobe and left frontal   lobe likely multifocal pneumonia  < end of copied text >    TTE:

## 2018-11-14 NOTE — PROGRESS NOTE ADULT - ASSESSMENT
38 year old female no PMH pw cough, fever, chills, diarrhea, weakness worsening over past 6 days. reports feeling ill after taking care of her children (3yo & 7mn old) after they came back with a cold from . Patient reports unproductive cough, along with shortness of breath when coughing. Denies chest pain, long car rides. Also with diarrhea for past few days along with associated abdominal pain, nausea and vomiting. Denies blood in diarrhea and vomitus. Decreased PO intake and trouble keeping down fluids and food over last few days. Patient also with headache and mild neck pain. Gradual onset, without photophobia, no focal weakness. Denies travel recently except for [place] 6 weeks ago. Reports she is in the process of moving to [place].    ID: Likely CAP with RML infiltrate. IVF stopped. Mucinex 1,200 mg bid. RSV panel positive.   Procalcitonin mild elevation at .13. Agree with IV Levaquin 500 mg IVSS daily. UA negative for   signs of infection. Oxygen sat 92 % on RA in the ER. Add nasal canuli 2.0 lpm. Will check   lactate today.  Will ask Pulmonary to follow, CT chest non-contrast shows RLL and LLL infiltrate.    WBC improved to 10,000. Lactate normal at .80.     Possible discharge home AM thursday if OK with Pulmonary.

## 2018-11-15 VITALS
OXYGEN SATURATION: 95 % | TEMPERATURE: 99 F | SYSTOLIC BLOOD PRESSURE: 132 MMHG | RESPIRATION RATE: 18 BRPM | DIASTOLIC BLOOD PRESSURE: 81 MMHG | HEART RATE: 84 BPM

## 2018-11-15 PROCEDURE — 87581 M.PNEUMON DNA AMP PROBE: CPT

## 2018-11-15 PROCEDURE — 82330 ASSAY OF CALCIUM: CPT

## 2018-11-15 PROCEDURE — 81001 URINALYSIS AUTO W/SCOPE: CPT

## 2018-11-15 PROCEDURE — 85014 HEMATOCRIT: CPT

## 2018-11-15 PROCEDURE — 80048 BASIC METABOLIC PNL TOTAL CA: CPT

## 2018-11-15 PROCEDURE — 83605 ASSAY OF LACTIC ACID: CPT

## 2018-11-15 PROCEDURE — 96374 THER/PROPH/DIAG INJ IV PUSH: CPT

## 2018-11-15 PROCEDURE — 99285 EMERGENCY DEPT VISIT HI MDM: CPT | Mod: 25

## 2018-11-15 PROCEDURE — 82947 ASSAY GLUCOSE BLOOD QUANT: CPT

## 2018-11-15 PROCEDURE — 84295 ASSAY OF SERUM SODIUM: CPT

## 2018-11-15 PROCEDURE — 82435 ASSAY OF BLOOD CHLORIDE: CPT

## 2018-11-15 PROCEDURE — 87086 URINE CULTURE/COLONY COUNT: CPT

## 2018-11-15 PROCEDURE — 87449 NOS EACH ORGANISM AG IA: CPT

## 2018-11-15 PROCEDURE — 71250 CT THORAX DX C-: CPT

## 2018-11-15 PROCEDURE — 82803 BLOOD GASES ANY COMBINATION: CPT

## 2018-11-15 PROCEDURE — G0378: CPT

## 2018-11-15 PROCEDURE — 71046 X-RAY EXAM CHEST 2 VIEWS: CPT

## 2018-11-15 PROCEDURE — 87040 BLOOD CULTURE FOR BACTERIA: CPT

## 2018-11-15 PROCEDURE — 96375 TX/PRO/DX INJ NEW DRUG ADDON: CPT

## 2018-11-15 PROCEDURE — 87486 CHLMYD PNEUM DNA AMP PROBE: CPT

## 2018-11-15 PROCEDURE — 85027 COMPLETE CBC AUTOMATED: CPT

## 2018-11-15 PROCEDURE — 93005 ELECTROCARDIOGRAM TRACING: CPT

## 2018-11-15 PROCEDURE — 87633 RESP VIRUS 12-25 TARGETS: CPT

## 2018-11-15 PROCEDURE — 94640 AIRWAY INHALATION TREATMENT: CPT

## 2018-11-15 PROCEDURE — 80053 COMPREHEN METABOLIC PANEL: CPT

## 2018-11-15 PROCEDURE — 85730 THROMBOPLASTIN TIME PARTIAL: CPT

## 2018-11-15 PROCEDURE — 84145 PROCALCITONIN (PCT): CPT

## 2018-11-15 PROCEDURE — 87798 DETECT AGENT NOS DNA AMP: CPT

## 2018-11-15 PROCEDURE — 85610 PROTHROMBIN TIME: CPT

## 2018-11-15 PROCEDURE — 84132 ASSAY OF SERUM POTASSIUM: CPT

## 2018-11-15 RX ORDER — ALBUTEROL 90 UG/1
2 AEROSOL, METERED ORAL
Qty: 1 | Refills: 0 | OUTPATIENT
Start: 2018-11-15 | End: 2018-12-14

## 2018-11-15 RX ORDER — ALBUTEROL 90 UG/1
2 AEROSOL, METERED ORAL EVERY 6 HOURS
Qty: 0 | Refills: 0 | Status: DISCONTINUED | OUTPATIENT
Start: 2018-11-15 | End: 2018-11-15

## 2018-11-15 RX ADMIN — Medication 1200 MILLIGRAM(S): at 06:23

## 2018-11-15 NOTE — PROGRESS NOTE ADULT - SUBJECTIVE AND OBJECTIVE BOX
Follow-up Pulm Progress Note  Marcus Crowley MD  [phone]    Afebrile so far today.  Feels better, c/o nocturnal cough, weakness  Breathing comfortably supine  CT Chest Reviewed: RLL dense consolidation with trace effusion; smaller patchy LLL consolidation  Lg antigen negative    Vital Signs Last 24 Hrs  T(C): 37.4 (15 Nov 2018 05:58), Max: 37.4 (15 Nov 2018 05:58)  T(F): 99.3 (15 Nov 2018 05:58), Max: 99.3 (15 Nov 2018 05:58)  HR: 73 (15 Nov 2018 05:58) (73 - 95)  BP: 116/71 (15 Nov 2018 05:58) (108/73 - 124/79)  BP(mean): --  RR: 18 (15 Nov 2018 05:58) (18 - 18)  SpO2: 94% (15 Nov 2018 05:58) (93% - 96%)                          11.0   6.30  )-----------( 338      ( 14 Nov 2018 09:01 )             33.7       11-14    141  |  106  |  6<L>  ----------------------------<  108<H>  3.6   |  22  |  0.62    Ca    9.8      14 Nov 2018 07:08          Procalcitonin, Serum: 0.13 ng/mL (11-12-18 @ 10:15)    CULTURES:  Culture Results:   Culture grew 3 or more types of organisms which indicate  collection contamination; consider recollection only if clinically  indicated. (11-12 @ 17:02)  Culture Results:   No growth to date. (11-12 @ 14:18)  Culture Results:   No growth to date. (11-12 @ 12:32)    Most recent blood culture -- 11-12 @ 17:02   -- -- .Urine Clean Catch (Midstream) 11-12 @ 17:02  Most recent blood culture -- 11-12 @ 14:18   -- -- .Blood Blood-Peripheral 11-12 @ 14:18  Most recent blood culture -- 11-12 @ 12:32   -- -- .Blood Blood-Peripheral 11-12 @ 12:32      Physical Examination:  PULM: Imlproved aeration RLL: decr in bronchial breathing; L clear  CVS: Regular rate and rhythm, no murmurs, rubs, or gallops  ABD: Soft, non-tender  EXT:  No clubbing, cyanosis, or edema    RADIOLOGY REVIEWED PERSONALLY  CXR:    CT chest:    PROCEDURE DATE:  11/13/2018        INTERPRETATION:  CLINICAL INFORMATION: Shortness of breath and hypoxemia.   Right lower lobe pneumonia on radiograph.    COMPARISON: Chest radiograph 11/12/2018. No prior chest CT.    PROCEDURE:   CT of the Chest was performed without intravenous contrast.  Sagittal and coronal reformats were performed.   Maximum intensity projection images were generated.    FINDINGS:    LUNGS AND LARGE AIRWAYS: Patent central airways. Dense consolidation of   the right lower lobe and left lower lobe compatible with multifocal   pneumonia.  PLEURA: Trace right pleural effusion  VESSELS: Within normal limits.  HEART: Heart size is normal. No pericardial effusion.  MEDIASTINUM AND KENNY: Enlarged right hilar mediastinal lymph nodes   incompletely evaluated without contrast.  CHEST WALL AND LOWER NECK: Within normal limits.  VISUALIZED UPPER ABDOMEN: Within normal limits.  BONES: Within normal limits.    IMPRESSION: Dense consolidation  of the right lower lobe and left frontal   lobe likely multifocal pneumonia  < end of copied text >    TTE:

## 2018-11-17 LAB
CULTURE RESULTS: SIGNIFICANT CHANGE UP
CULTURE RESULTS: SIGNIFICANT CHANGE UP
SPECIMEN SOURCE: SIGNIFICANT CHANGE UP
SPECIMEN SOURCE: SIGNIFICANT CHANGE UP

## 2018-11-20 ENCOUNTER — TRANSCRIPTION ENCOUNTER (OUTPATIENT)
Age: 38
End: 2018-11-20

## 2020-07-14 NOTE — H&P ADULT - HISTORY OF PRESENT ILLNESS
Detail Level: Detailed
Detail Level: Zone
38 year old female no PMH pw cough, fever, chills, diarrhea, weakness worsening over past 6 days. reports feeling ill after taking care of her children (5yo & 7mn old) after they came back with a cold from . Patient reports unproductive cough, along with shortness of breath when coughing. Denies chest pain, long car rides. Also with diarrhea for past few days along with associated abdominal pain, nausea and vomiting. Denies blood in diarrhea and vomitus. Decreased PO intake and trouble keeping down fluids and food over last few days. Patient also with headache and mild neck pain. Gradual onset, without photophobia, no focal weakness. Denies travel recently except for [place] 6 weeks ago. Reports she is in the process of moving to [place].

## 2022-11-04 NOTE — PROGRESS NOTE ADULT - ASSESSMENT
Community acquired multilobar pneumonia: clinically improving today    REC    Continue Levaquin - anticipate 7 day course  Mobilize as tolerated [Fever] : no fever [Chills] : no chills [Chest Pain] : no chest pain [Palpitations] : no palpitations [Shortness Of Breath] : no shortness of breath [Dizziness] : no dizziness [Fainting] : no fainting